# Patient Record
Sex: MALE | Race: WHITE | NOT HISPANIC OR LATINO | Employment: FULL TIME | URBAN - METROPOLITAN AREA
[De-identification: names, ages, dates, MRNs, and addresses within clinical notes are randomized per-mention and may not be internally consistent; named-entity substitution may affect disease eponyms.]

---

## 2017-01-05 DIAGNOSIS — I10 ESSENTIAL HYPERTENSION: ICD-10-CM

## 2017-01-06 RX ORDER — AMLODIPINE BESYLATE 10 MG/1
TABLET ORAL
Qty: 30 TABLET | Refills: 2 | Status: SHIPPED | OUTPATIENT
Start: 2017-01-06 | End: 2017-04-11 | Stop reason: SDUPTHER

## 2017-02-06 DIAGNOSIS — I10 ESSENTIAL HYPERTENSION: ICD-10-CM

## 2017-02-07 RX ORDER — OLMESARTAN MEDOXOMIL 40 MG/1
TABLET ORAL
Qty: 30 TABLET | Refills: 2 | Status: SHIPPED | OUTPATIENT
Start: 2017-02-07 | End: 2017-05-11 | Stop reason: SDUPTHER

## 2017-03-08 DIAGNOSIS — I10 ESSENTIAL HYPERTENSION: ICD-10-CM

## 2017-03-08 RX ORDER — OLMESARTAN MEDOXOMIL 40 MG/1
TABLET ORAL
Qty: 30 TABLET | Refills: 0 | OUTPATIENT
Start: 2017-03-08

## 2017-04-04 DIAGNOSIS — Z00.00 PREVENTATIVE HEALTH CARE: Primary | ICD-10-CM

## 2017-04-06 LAB
25(OH)D3+25(OH)D2 SERPL-MCNC: 44.2 NG/ML (ref 30–100)
ALBUMIN SERPL-MCNC: 4.5 G/DL (ref 3.5–5.2)
ALBUMIN/GLOB SERPL: 1.6 G/DL
ALP SERPL-CCNC: 68 U/L (ref 39–117)
ALT SERPL-CCNC: 22 U/L (ref 1–41)
APPEARANCE UR: CLEAR
AST SERPL-CCNC: 18 U/L (ref 1–40)
BASOPHILS # BLD AUTO: 0.02 10*3/MM3 (ref 0–0.2)
BASOPHILS NFR BLD AUTO: 0.4 % (ref 0–1.5)
BILIRUB SERPL-MCNC: 0.6 MG/DL (ref 0.1–1.2)
BILIRUB UR QL STRIP: NEGATIVE
BUN SERPL-MCNC: 19 MG/DL (ref 6–20)
BUN/CREAT SERPL: 20.7 (ref 7–25)
CALCIUM SERPL-MCNC: 9.9 MG/DL (ref 8.6–10.5)
CHLORIDE SERPL-SCNC: 101 MMOL/L (ref 98–107)
CHOLEST SERPL-MCNC: 212 MG/DL (ref 0–200)
CHOLEST/HDLC SERPL: 4.24 {RATIO}
CO2 SERPL-SCNC: 24.8 MMOL/L (ref 22–29)
COLOR UR: YELLOW
CONV COMMENT: NORMAL
CREAT SERPL-MCNC: 0.92 MG/DL (ref 0.76–1.27)
EOSINOPHIL # BLD AUTO: 0.16 10*3/MM3 (ref 0–0.7)
EOSINOPHIL NFR BLD AUTO: 3.6 % (ref 0.3–6.2)
ERYTHROCYTE [DISTWIDTH] IN BLOOD BY AUTOMATED COUNT: 12.6 % (ref 11.5–14.5)
GLOBULIN SER CALC-MCNC: 2.9 GM/DL
GLUCOSE SERPL-MCNC: 94 MG/DL (ref 65–99)
GLUCOSE UR QL: NEGATIVE
HBA1C MFR BLD: 5.2 % (ref 4.8–5.6)
HCT VFR BLD AUTO: 42.4 % (ref 40.4–52.2)
HDLC SERPL-MCNC: 50 MG/DL (ref 40–60)
HGB BLD-MCNC: 14.4 G/DL (ref 13.7–17.6)
HGB UR QL STRIP: NEGATIVE
IMM GRANULOCYTES # BLD: 0 10*3/MM3 (ref 0–0.03)
IMM GRANULOCYTES NFR BLD: 0 % (ref 0–0.5)
KETONES UR QL STRIP: NEGATIVE
LDLC SERPL CALC-MCNC: 135 MG/DL (ref 0–100)
LEUKOCYTE ESTERASE UR QL STRIP: NEGATIVE
LYMPHOCYTES # BLD AUTO: 1.18 10*3/MM3 (ref 0.9–4.8)
LYMPHOCYTES NFR BLD AUTO: 26.2 % (ref 19.6–45.3)
MCH RBC QN AUTO: 30 PG (ref 27–32.7)
MCHC RBC AUTO-ENTMCNC: 34 G/DL (ref 32.6–36.4)
MCV RBC AUTO: 88.3 FL (ref 79.8–96.2)
MONOCYTES # BLD AUTO: 0.34 10*3/MM3 (ref 0.2–1.2)
MONOCYTES NFR BLD AUTO: 7.6 % (ref 5–12)
NEUTROPHILS # BLD AUTO: 2.8 10*3/MM3 (ref 1.9–8.1)
NEUTROPHILS NFR BLD AUTO: 62.2 % (ref 42.7–76)
NITRITE UR QL STRIP: NEGATIVE
PH UR STRIP: 7.5 [PH] (ref 5–8)
PLATELET # BLD AUTO: 247 10*3/MM3 (ref 140–500)
POTASSIUM SERPL-SCNC: 4.1 MMOL/L (ref 3.5–5.2)
PROT SERPL-MCNC: 7.4 G/DL (ref 6–8.5)
PROT UR QL STRIP: NEGATIVE
PSA SERPL-MCNC: 0.72 NG/ML (ref 0–4)
RBC # BLD AUTO: 4.8 10*6/MM3 (ref 4.6–6)
SODIUM SERPL-SCNC: 139 MMOL/L (ref 136–145)
SP GR UR: 1.02 (ref 1–1.03)
T4 FREE SERPL-MCNC: 1.24 NG/DL (ref 0.93–1.7)
TESTOST SERPL-MCNC: 564 NG/DL (ref 348–1197)
TRIGL SERPL-MCNC: 135 MG/DL (ref 0–150)
TSH SERPL DL<=0.005 MIU/L-ACNC: 3.39 MIU/ML (ref 0.27–4.2)
UROBILINOGEN UR STRIP-MCNC: NORMAL MG/DL
VLDLC SERPL CALC-MCNC: 27 MG/DL (ref 5–40)
WBC # BLD AUTO: 4.5 10*3/MM3 (ref 4.5–10.7)

## 2017-04-11 DIAGNOSIS — I10 ESSENTIAL HYPERTENSION: ICD-10-CM

## 2017-04-11 RX ORDER — AMLODIPINE BESYLATE 10 MG/1
TABLET ORAL
Qty: 30 TABLET | Refills: 2 | Status: SHIPPED | OUTPATIENT
Start: 2017-04-11 | End: 2017-07-13 | Stop reason: SDUPTHER

## 2017-04-12 ENCOUNTER — OFFICE VISIT (OUTPATIENT)
Dept: INTERNAL MEDICINE | Age: 47
End: 2017-04-12

## 2017-04-12 VITALS
TEMPERATURE: 97.9 F | BODY MASS INDEX: 30.31 KG/M2 | DIASTOLIC BLOOD PRESSURE: 70 MMHG | HEIGHT: 68 IN | HEART RATE: 80 BPM | WEIGHT: 200 LBS | OXYGEN SATURATION: 99 % | SYSTOLIC BLOOD PRESSURE: 122 MMHG

## 2017-04-12 DIAGNOSIS — E78.2 MIXED HYPERLIPIDEMIA: Chronic | ICD-10-CM

## 2017-04-12 DIAGNOSIS — M22.2X1 PATELLOFEMORAL DISORDER OF RIGHT KNEE: ICD-10-CM

## 2017-04-12 DIAGNOSIS — J30.9 ATOPIC RHINITIS: Chronic | ICD-10-CM

## 2017-04-12 DIAGNOSIS — Z00.00 ENCOUNTER FOR ANNUAL HEALTH EXAMINATION: Primary | ICD-10-CM

## 2017-04-12 PROCEDURE — 99212 OFFICE O/P EST SF 10 MIN: CPT | Performed by: INTERNAL MEDICINE

## 2017-04-12 PROCEDURE — 99396 PREV VISIT EST AGE 40-64: CPT | Performed by: INTERNAL MEDICINE

## 2017-04-12 RX ORDER — FEXOFENADINE HCL 180 MG/1
180 TABLET ORAL DAILY
COMMUNITY
Start: 2017-04-12

## 2017-04-12 NOTE — PROGRESS NOTES
Jackson DOTSON Stewart / 46 y.o. / male  04/12/2017    CC: Main reason(s) for today's visit: Annual Exam      HPI:      Jackson presents for annual health maintenance visit.  H/o hyperlipidemia, lost 17 lbs but LDL has not improved significantly. Treated for essential hypertension. Not a smoker. Some soft family h/o CV dz.   C/o sinus / nasal congestion, pnd, rhinorrhea, sneezing. H/o allergic rhinitis on flonase.    · Last health maintenance visit: unsure  · General health: fair  · Lifestyle:  · Attempting to lose weight?: Yes   · Diet: adequate/fair  · Exercise: adequate/fair  · Tobacco: Never used   · Alcohol: occasional/rare  · Work: Full-time  · Reproductive health:  · Sexually active?: Yes   · Concern for STD?: No   · Sexual problems?: No problems   · Sees Urologist?: No    · Depression Screening:      PHQ-9 Depression Screening 4/12/2017   Little interest or pleasure in doing things 0   Feeling down, depressed, or hopeless 0   PHQ-9 Total Score 0         PHQ-2: 0 (Not depressed)     PHQ-9: 0 (Negative screening for depression)      ______________________________________________________________________    ALLERGIES:    Review of patient's allergies indicates no known allergies.    MEDICATIONS:       Current Outpatient Prescriptions   Medication Sig Dispense Refill   • amLODIPine (NORVASC) 10 MG tablet TAKE 1 TABLET BY MOUTH DAILY 30 tablet 2   • fluticasone (FLONASE) 50 MCG/ACT nasal spray 2 sprays into each nostril daily. Administer 2 sprays in each nostril for each dose.     • Multiple Vitamin (MULTI-VITAMIN) tablet Take 1 tablet by mouth daily.     • olmesartan (BENICAR) 40 MG tablet TAKE 1 TABLET BY MOUTH DAILY 30 tablet 2   • fexofenadine (ALLEGRA ALLERGY) 180 MG tablet Take 1 tablet by mouth Daily.       No current facility-administered medications for this visit.        PFSH:     The following portions of the patient's history were reviewed and updated as appropriate: Allergies / Current Medications / Past Medical  History / Surgical History / Social History / Family History    PROBLEM LIST:    Patient Active Problem List   Diagnosis   • Atopic rhinitis   • Anemia   • Arthralgia of multiple joints   • Carpal tunnel syndrome   • Hyperlipidemia   • Hypertension   • Elevated hemoglobin A1c   • Snoring   • Vitamin D deficiency   • Malignant melanoma   • Patellofemoral disorder of right knee       PAST MEDICAL HX:    Past Medical History:   Diagnosis Date   • Hyperlipidemia    • Hypertension    • Seasonal allergies    • Vitamin D deficiency        PAST SURGICAL HX:    Past Surgical History:   Procedure Laterality Date   • MOLE REMOVAL     • WRIST SURGERY      RIGHT-TENDON REPAIR       SOCIAL HX:    Social History     Social History   • Marital status: Single     Spouse name: N/A   • Number of children: 0   • Years of education: N/A     Occupational History   • construction      Social History Main Topics   • Smoking status: Never Smoker   • Smokeless tobacco: Never Used   • Alcohol use 0.6 oz/week     1 Shots of liquor per week      Comment: OCCASIONALLY   • Drug use: No   • Sexual activity: Not Asked     Other Topics Concern   • None     Social History Narrative       FAMILY HX:    Family History   Problem Relation Age of Onset   • Breast cancer Mother    • Hypertension Father    • Hypertension Brother    • Hypertension Brother    • Lung cancer Maternal Grandfather      smoker   • Heart failure Paternal Grandfather    • Heart attack Paternal Uncle 80       IMMUNIZATION HISTORY:    Immunization History   Administered Date(s) Administered   • Influenza (IM) Preservative Free 12/14/2016   • Influenza Split Preservative Free ID 10/02/2013   • Tdap 12/14/2016       ______________________________________________________________________    REVIEW OF SYSTEMS    Review of Systems   Constitutional:        Intended wt loss of 17 obs   HENT: Positive for congestion, postnasal drip, rhinorrhea and sneezing.    Eyes: Negative.    Respiratory:  "Negative.    Cardiovascular: Negative.    Gastrointestinal: Negative.    Endocrine: Negative.    Genitourinary: Negative.    Musculoskeletal: Arthralgias: rt knee pain.   Skin: Negative.    Allergic/Immunologic: Positive for environmental allergies.   Neurological: Negative.    Hematological: Negative.    Psychiatric/Behavioral: Negative.        VITALS:    /70  Pulse 80  Temp 97.9 °F (36.6 °C)  Ht 68\" (172.7 cm)  Wt 200 lb (90.7 kg)  SpO2 99%  BMI 30.41 kg/m2  BP Readings from Last 3 Encounters:   04/12/17 122/70   12/14/16 136/82   07/07/16 130/80     Wt Readings from Last 3 Encounters:   04/12/17 200 lb (90.7 kg)   12/14/16 217 lb (98.4 kg)   07/07/16 217 lb (98.4 kg)      Body mass index is 30.41 kg/(m^2).    PHYSICAL EXAMINATION    Physical Exam   Constitutional: He is oriented to person, place, and time. He appears well-nourished. No distress.   Noted wt loss   HENT:   Head: Normocephalic.   Right Ear: External ear normal.   Left Ear: External ear normal.   Nose: Nose normal.   Mouth/Throat: Oropharynx is clear and moist.   Eyes: Conjunctivae and EOM are normal. Pupils are equal, round, and reactive to light. No scleral icterus.   Neck: Normal range of motion. Neck supple. No tracheal deviation present. No thyromegaly present.   Cardiovascular: Normal rate, regular rhythm, normal heart sounds and intact distal pulses.  Exam reveals no gallop and no friction rub.    No murmur heard.  Pulmonary/Chest: Effort normal and breath sounds normal.   Abdominal: Soft. Bowel sounds are normal. He exhibits no distension and no mass. There is no tenderness. No hernia.   Genitourinary: Testes normal and penis normal.   Musculoskeletal: Normal range of motion. He exhibits no edema or deformity.   Mild crepitus of right knee, mild prepatellar bursal swelling   Lymphadenopathy:     He has no cervical adenopathy.     He has no axillary adenopathy.        Right: No inguinal and no supraclavicular adenopathy present.    "     Left: No inguinal and no supraclavicular adenopathy present.   Neurological: He is alert and oriented to person, place, and time. He has normal reflexes. He displays normal reflexes. No cranial nerve deficit. He exhibits normal muscle tone. Coordination normal.   Skin: Skin is intact. No rash noted. He is not diaphoretic. No cyanosis or erythema. No pallor. Nails show no clubbing.   Scar upper back   Psychiatric: He has a normal mood and affect. His behavior is normal. Judgment and thought content normal. Cognition and memory are normal.       REVIEWED DATA:    Lab Results   Component Value Date    GLU 94 04/05/2017     04/05/2017    K 4.1 04/05/2017    AST 18 04/05/2017    ALT 22 04/05/2017    BUN 19 04/05/2017    CREATININE 0.92 04/05/2017    CREATININE 0.89 07/07/2016    CREATININE 0.84 12/29/2015    EGFRIFNONA 89 04/05/2017    EGFRIFAFRI 108 04/05/2017    HGBA1C 5.20 04/05/2017    HGBA1C 5.40 07/07/2016    HGBA1C 5.8 (H) 12/29/2015     (H) 04/05/2017     (H) 07/07/2016     (H) 12/29/2015    HDL 50 04/05/2017    HDL 49 07/07/2016    HDL 47 12/29/2015    TRIG 135 04/05/2017    TRIG 231 (H) 07/07/2016    TRIG 269 (H) 12/29/2015    CHOLHDLRATIO 4.24 04/05/2017    TSH 3.390 04/05/2017    FREET4 1.24 04/05/2017    SMCR20DZ 44.2 04/05/2017    PSA 0.719 04/05/2017    TESTOSTEROTT 564 04/05/2017    WBC 4.50 04/05/2017    HGB 14.4 04/05/2017    MCV 88.3 04/05/2017     04/05/2017    PROTEIN Negative 04/05/2017    GLUCOSEU Negative 04/05/2017    BLOODU Negative 04/05/2017    NITRITEU Negative 04/05/2017    LEUKOCYTESUR Negative 04/05/2017     ______________________________________________________________________    ASSESSMENT & PLAN    1. Encounter for annual health examination      2. Mixed hyperlipidemia  Will recheck lipid panel in 6 months. If not improving will consider statin therapy   Maintain low fat/cholesterol diet.  Continue good lifestyle improvements.    3. Atopic  rhinitis  - fexofenadine (ALLEGRA ALLERGY) 180 MG tablet; Take 1 tablet by mouth Daily.  - continue flonase nasal daily    4. Patellofemoral syndrome (right)  - handout for exercise given; see me if not improving        Summary/Discussion:     ·       Return in about 6 months (around 10/12/2017) for Hypertension, Hyperlipidemia.    Future Appointments  Date Time Provider Department Center   10/12/2017 9:00 AM MD JACOB [facility] None         HEALTHCARE MAINTENANCE ISSUES:    Cancer Screening:  · Colon: Initial/Next screening at age: 51  · Repeat colonoscopy every N/A at this time  · Prostate: Start screening at 50 then annually  · Testicular: Recommended monthly self exam  · Skin: Monthly self skin examination, annual exam by health professional  · Lung:   · Other:    Screening Labs & Tests:  · Lab results reviewed & discussed with with patient or orders placed today.  · EKG:  · DEXA (75+ or risk factors):  · HEP C (If born [id number] or risk factors): Not indicated    Immunization/Vaccinations (to be given today unless deferred by patient)  · Tetanus/Pertussis: Up to date  · Pneumovax: Not needed at this time  · Prevnar 13: Not needed at this time  · Zostavax: Not needed at this time  · Influenza: Patient already had the flu shot  · Other:     Lifestyle Counseling:  · Lifestyle Modifications: Continue good lifestyle choices/modifications, Maintain a low sugar/carbohydrate diet and Follow a low fat, low cholesterol diet  · Safety Issues: Always wear seatbelt, Avoid texting while driving   · Use sunscreen, regular skin examination  · Recommended annual dental/vision examination.  · Emotional/Stress/Sleep: Reviewed and  given when appropriate      HEALTH MAINTENANCE LIST:    Health Maintenance   Topic Date Due   • LIPID PANEL  04/05/2018   • TDAP/TD VACCINES (2 - Td) 12/14/2026   • INFLUENZA VACCINE  Completed         **Key Disclaimer:   Much of this encounter note is an electronic  transcription/translation of spoken language to printed text. The electronic translation of spoken language may permit erroneous, or at times, nonsensical words or phrases to be inadvertently transcribed. Although I have reviewed the note for such errors, some may still exist.

## 2017-05-11 DIAGNOSIS — I10 ESSENTIAL HYPERTENSION: ICD-10-CM

## 2017-05-12 RX ORDER — OLMESARTAN MEDOXOMIL 40 MG/1
TABLET ORAL
Qty: 30 TABLET | Refills: 5 | Status: SHIPPED | OUTPATIENT
Start: 2017-05-12 | End: 2017-11-13 | Stop reason: SDUPTHER

## 2017-07-13 DIAGNOSIS — I10 ESSENTIAL HYPERTENSION: ICD-10-CM

## 2017-07-14 RX ORDER — AMLODIPINE BESYLATE 10 MG/1
TABLET ORAL
Qty: 30 TABLET | Refills: 2 | Status: SHIPPED | OUTPATIENT
Start: 2017-07-14 | End: 2017-10-14 | Stop reason: SDUPTHER

## 2017-10-12 ENCOUNTER — OFFICE VISIT (OUTPATIENT)
Dept: INTERNAL MEDICINE | Age: 47
End: 2017-10-12

## 2017-10-12 VITALS
HEIGHT: 68 IN | HEART RATE: 62 BPM | TEMPERATURE: 98.1 F | SYSTOLIC BLOOD PRESSURE: 124 MMHG | WEIGHT: 208 LBS | OXYGEN SATURATION: 98 % | BODY MASS INDEX: 31.52 KG/M2 | DIASTOLIC BLOOD PRESSURE: 70 MMHG

## 2017-10-12 DIAGNOSIS — Z00.00 PREVENTATIVE HEALTH CARE: Primary | ICD-10-CM

## 2017-10-12 DIAGNOSIS — I10 ESSENTIAL HYPERTENSION: Primary | Chronic | ICD-10-CM

## 2017-10-12 DIAGNOSIS — F32.9 REACTIVE DEPRESSION (SITUATIONAL): ICD-10-CM

## 2017-10-12 DIAGNOSIS — E66.9 OBESITY (BMI 30-39.9): Chronic | ICD-10-CM

## 2017-10-12 DIAGNOSIS — E78.2 MIXED HYPERLIPIDEMIA: Chronic | ICD-10-CM

## 2017-10-12 LAB
CHOLEST SERPL-MCNC: 235 MG/DL (ref 0–200)
CHOLEST/HDLC SERPL: 4.12 {RATIO}
HDLC SERPL-MCNC: 57 MG/DL (ref 40–60)
LDLC SERPL CALC-MCNC: 145 MG/DL (ref 0–100)
TRIGL SERPL-MCNC: 165 MG/DL (ref 0–150)
VLDLC SERPL CALC-MCNC: 33 MG/DL (ref 5–40)

## 2017-10-12 PROCEDURE — 99214 OFFICE O/P EST MOD 30 MIN: CPT | Performed by: INTERNAL MEDICINE

## 2017-10-12 PROCEDURE — 90686 IIV4 VACC NO PRSV 0.5 ML IM: CPT | Performed by: INTERNAL MEDICINE

## 2017-10-12 PROCEDURE — 90471 IMMUNIZATION ADMIN: CPT | Performed by: INTERNAL MEDICINE

## 2017-10-12 NOTE — ASSESSMENT & PLAN NOTE
Relationship related. Recommended therapy. Counseled x 15 minutes. Consider medication if worsening symptoms.

## 2017-10-12 NOTE — PROGRESS NOTES
"Jackson DOTSON Stewart / 46 y.o. / male  10/12/2017    VITALS    /70  Pulse 62  Temp 98.1 °F (36.7 °C)  Ht 68\" (172.7 cm)  Wt 208 lb (94.3 kg)  SpO2 98%  BMI 31.63 kg/m2  BP Readings from Last 3 Encounters:   10/12/17 124/70   04/12/17 122/70   12/14/16 136/82     Wt Readings from Last 3 Encounters:   10/12/17 208 lb (94.3 kg)   04/12/17 200 lb (90.7 kg)   12/14/16 217 lb (98.4 kg)      Body mass index is 31.63 kg/(m^2).    CC:  Main reason(s) for today's visit: Follow-up for hypertension, cholesterol and other related medical problems    HPI:   Broke off engagement after 4 years relationship, going through transition difficulties x 2 months. No SI. Has not seen a therapist.  Weight gain of 8 lbs due to not taking good care of self. Works 6 days a week.    Obesity: Weight has been increasing since last visit, has not made any significant dietary improvement, has not made significant changes to physical activity level, is not motivated to work on weight loss.  Weight loss efforts: no specific plan(s).  Current Body mass index is 31.63 kg/(m^2)..   Wt Readings from Last 3 Encounters:   10/12/17 208 lb (94.3 kg)   04/12/17 200 lb (90.7 kg)   12/14/16 217 lb (98.4 kg)         Chronic essential hypertension:  Since prior visit: compliant with medication(s) and denies significant problems with medication(s).  Most recent in-office blood pressure readings:   BP Readings from Last 3 Encounters:   10/12/17 124/70   04/12/17 122/70   12/14/16 136/82       Chronic hyperlipidemia:  Current therapy include low fat/cholesterol diet, really has not been watching his diet. .    Most recent labs:   Lab Results   Component Value Date     (H) 04/05/2017     (H) 07/07/2016     (H) 12/29/2015    HDL 50 04/05/2017    HDL 49 07/07/2016    TRIG 135 04/05/2017    CHOLHDLRATIO 4.24 04/05/2017       Patient Care Team:  Kelby Seo MD as PCP - General (Internal " Medicine)  ____________________________________________________________________    ASSESSMENT & PLAN:    Problem List Items Addressed This Visit     Hypertension - Primary (Chronic)    Overview     Stable. Continue amlodipine 10 mg and olmesartan 40 mg.          Relevant Medications    olmesartan (BENICAR) 40 MG tablet    amLODIPine (NORVASC) 10 MG tablet    Hyperlipidemia (Chronic)    Current Assessment & Plan     Maintain low fat/cholesterol diet.  Check fasting labs today and consider medication if not better.          Relevant Orders    Lipid Panel With / Chol / HDL Ratio    Reactive depression (situational)    Current Assessment & Plan     Relationship related. Recommended therapy. Counseled x 15 minutes. Consider medication if worsening symptoms.          Obesity (BMI 30-39.9) (Chronic)        Orders Placed This Encounter   Procedures   • Lipid Panel With / Chol / HDL Ratio       Summary/Discussion:     ·     Return in about 6 months (around 4/12/2018) for Hypertension, hyperlipidemia.    Future Appointments  Date Time Provider Department Center   4/12/2018 8:40 AM MD [facility] None       ____________________________________________________________________      REVIEW OF SYSTEMS    Review of Systems  As noted per HPI  Constitutional neg  Resp neg  CV neg   Other: As noted per HPI      PHYSICAL EXAMINATION    Physical Exam  Constitutional  No distress  Cardiovascular Rate  normal . Rhythm: regular . Heart sounds:  normal  Pulmonary/Chest  Effort normal. Breath sounds:  normal  Psychiatric  Alert. Judgment and thought content normal. Mood normal    REVIEWED DATA:    Labs:   Lab Results   Component Value Date     04/05/2017    K 4.1 04/05/2017    AST 18 04/05/2017    ALT 22 04/05/2017    BUN 19 04/05/2017    CREATININE 0.92 04/05/2017    CREATININE 0.89 07/07/2016    CREATININE 0.84 12/29/2015    EGFRIFNONA 89 04/05/2017    EGFRIFAFRI 108 04/05/2017       Lab Results   Component Value Date     HGBA1C 5.20 04/05/2017    HGBA1C 5.40 07/07/2016    HGBA1C 5.8 (H) 12/29/2015    GLU 94 04/05/2017    GLU 92 07/07/2016    GLU 95 12/29/2015       Lab Results   Component Value Date     (H) 04/05/2017     (H) 07/07/2016     (H) 12/29/2015    HDL 50 04/05/2017    HDL 49 07/07/2016    TRIG 135 04/05/2017    CHOLHDLRATIO 4.24 04/05/2017       Lab Results   Component Value Date    TSH 3.390 04/05/2017    FREET4 1.24 04/05/2017          Lab Results   Component Value Date    WBC 4.50 04/05/2017    HGB 14.4 04/05/2017    HGB 15.3 07/07/2016    HGB 13.9 06/30/2015     04/05/2017        Imaging:        Medical Tests:        Summary of old records / correspondence / consultant report:        Request outside records:        ALLERGIES  No Known Allergies     MEDICATIONS  Current Outpatient Prescriptions   Medication Sig Dispense Refill   • amLODIPine (NORVASC) 10 MG tablet TAKE 1 TABLET BY MOUTH DAILY 30 tablet 2   • fexofenadine (ALLEGRA ALLERGY) 180 MG tablet Take 1 tablet by mouth Daily.     • fluticasone (FLONASE) 50 MCG/ACT nasal spray 2 sprays into each nostril daily. Administer 2 sprays in each nostril for each dose.     • Multiple Vitamin (MULTI-VITAMIN) tablet Take 1 tablet by mouth daily.     • olmesartan (BENICAR) 40 MG tablet TAKE 1 TABLET BY MOUTH DAILY 30 tablet 5     No current facility-administered medications for this visit.        PFSH:     The following portions of the patient's history were reviewed and updated as appropriate: Allergies / Current Medications / Past Medical History / Surgical History / Social History / Family History    PROBLEM LIST   Patient Active Problem List   Diagnosis   • Atopic rhinitis   • Anemia   • Arthralgia of multiple joints   • Carpal tunnel syndrome   • Hyperlipidemia   • Hypertension   • Elevated hemoglobin A1c   • Snoring   • Vitamin D deficiency   • Malignant melanoma   • Patellofemoral disorder of right knee   • Obesity (BMI 30-39.9)   • Reactive  depression (situational)       PAST MEDICAL HISTORY  Past Medical History:   Diagnosis Date   • Hyperlipidemia    • Hypertension    • Seasonal allergies    • Vitamin D deficiency        SURGICAL HISTORY  Past Surgical History:   Procedure Laterality Date   • MOLE REMOVAL     • WRIST SURGERY      RIGHT-TENDON REPAIR       SOCIAL HISTORY  Social History     Social History   • Marital status: Single     Spouse name: N/A   • Number of children: 0   • Years of education: N/A     Occupational History   • construction      Social History Main Topics   • Smoking status: Never Smoker   • Smokeless tobacco: Never Used   • Alcohol use 0.6 oz/week     1 Shots of liquor per week      Comment: OCCASIONALLY   • Drug use: No   • Sexual activity: Not Asked     Other Topics Concern   • None     Social History Narrative       FAMILY HISTORY  Family History   Problem Relation Age of Onset   • Breast cancer Mother    • Hypertension Father    • Hypertension Brother    • Hypertension Brother    • Lung cancer Maternal Grandfather      smoker   • Heart failure Paternal Grandfather    • Heart attack Paternal Uncle 80         **Key Disclaimer:   Much of this encounter note is an electronic transcription/translation of spoken language to printed text. The electronic translation of spoken language may permit erroneous, or at times, nonsensical words or phrases to be inadvertently transcribed. Although I have reviewed the note for such errors, some may still exist.

## 2017-10-12 NOTE — ASSESSMENT & PLAN NOTE
Maintain low fat/cholesterol diet.  Check fasting labs today and consider medication if not better.

## 2017-10-13 ENCOUNTER — TELEPHONE (OUTPATIENT)
Dept: INTERNAL MEDICINE | Age: 47
End: 2017-10-13

## 2017-10-13 NOTE — TELEPHONE ENCOUNTER
----- Message from Kelby Seo MD sent at 10/13/2017  8:15 AM EDT -----  Send result letter to patient.     Jackson, these are your recent lab results:    Cholesterol is moderately high. Maintain low fat/cholesterol diet. . Will discuss treatment options on follow up.     Please call my office if you have any questions. Otherwise, we can discuss the results in further detail on your next visit.

## 2017-10-14 DIAGNOSIS — I10 ESSENTIAL HYPERTENSION: ICD-10-CM

## 2017-10-16 RX ORDER — AMLODIPINE BESYLATE 10 MG/1
TABLET ORAL
Qty: 30 TABLET | Refills: 5 | Status: SHIPPED | OUTPATIENT
Start: 2017-10-16 | End: 2018-04-25 | Stop reason: SDUPTHER

## 2017-11-13 DIAGNOSIS — I10 ESSENTIAL HYPERTENSION: ICD-10-CM

## 2017-11-14 RX ORDER — OLMESARTAN MEDOXOMIL 40 MG/1
TABLET ORAL
Qty: 30 TABLET | Refills: 5 | Status: SHIPPED | OUTPATIENT
Start: 2017-11-14 | End: 2018-05-29 | Stop reason: SDUPTHER

## 2018-04-12 ENCOUNTER — OFFICE VISIT (OUTPATIENT)
Dept: INTERNAL MEDICINE | Age: 48
End: 2018-04-12

## 2018-04-12 VITALS
BODY MASS INDEX: 30.92 KG/M2 | SYSTOLIC BLOOD PRESSURE: 126 MMHG | DIASTOLIC BLOOD PRESSURE: 80 MMHG | HEIGHT: 68 IN | HEART RATE: 68 BPM | WEIGHT: 204 LBS | TEMPERATURE: 98 F | OXYGEN SATURATION: 98 %

## 2018-04-12 DIAGNOSIS — I10 ESSENTIAL HYPERTENSION: Primary | Chronic | ICD-10-CM

## 2018-04-12 DIAGNOSIS — E66.9 OBESITY (BMI 30-39.9): Chronic | ICD-10-CM

## 2018-04-12 DIAGNOSIS — E78.2 MIXED HYPERLIPIDEMIA: Chronic | ICD-10-CM

## 2018-04-12 DIAGNOSIS — F32.9 REACTIVE DEPRESSION (SITUATIONAL): ICD-10-CM

## 2018-04-12 LAB
ALBUMIN SERPL-MCNC: 4.5 G/DL (ref 3.5–5.2)
ALBUMIN/GLOB SERPL: 1.7 G/DL
ALP SERPL-CCNC: 55 U/L (ref 39–117)
ALT SERPL-CCNC: 21 U/L (ref 1–41)
AST SERPL-CCNC: 19 U/L (ref 1–40)
BILIRUB SERPL-MCNC: 0.5 MG/DL (ref 0.1–1.2)
BUN SERPL-MCNC: 15 MG/DL (ref 6–20)
BUN/CREAT SERPL: 16.9 (ref 7–25)
CALCIUM SERPL-MCNC: 9.2 MG/DL (ref 8.6–10.5)
CHLORIDE SERPL-SCNC: 102 MMOL/L (ref 98–107)
CHOLEST SERPL-MCNC: 219 MG/DL (ref 0–200)
CHOLEST/HDLC SERPL: 4.06 {RATIO}
CO2 SERPL-SCNC: 26.4 MMOL/L (ref 22–29)
CREAT SERPL-MCNC: 0.89 MG/DL (ref 0.76–1.27)
GFR SERPLBLD CREATININE-BSD FMLA CKD-EPI: 112 ML/MIN/1.73
GFR SERPLBLD CREATININE-BSD FMLA CKD-EPI: 92 ML/MIN/1.73
GLOBULIN SER CALC-MCNC: 2.7 GM/DL
GLUCOSE SERPL-MCNC: 88 MG/DL (ref 65–99)
HDLC SERPL-MCNC: 54 MG/DL (ref 40–60)
LDLC SERPL CALC-MCNC: 150 MG/DL (ref 0–100)
POTASSIUM SERPL-SCNC: 4.5 MMOL/L (ref 3.5–5.2)
PROT SERPL-MCNC: 7.2 G/DL (ref 6–8.5)
SODIUM SERPL-SCNC: 141 MMOL/L (ref 136–145)
T4 FREE SERPL-MCNC: 1.27 NG/DL (ref 0.93–1.7)
TRIGL SERPL-MCNC: 73 MG/DL (ref 0–150)
TSH SERPL DL<=0.005 MIU/L-ACNC: 3.16 MIU/ML (ref 0.27–4.2)
VLDLC SERPL CALC-MCNC: 14.6 MG/DL (ref 5–40)

## 2018-04-12 PROCEDURE — 99214 OFFICE O/P EST MOD 30 MIN: CPT | Performed by: INTERNAL MEDICINE

## 2018-04-12 NOTE — PROGRESS NOTES
[facility] [place] INTERNAL MEDICINE  LATONIA NAVARRO M.D.      Jackson DOTSON Stewart / 47 y.o. / male  04/12/2018      ASSESSMENT & PLAN:    Problem List Items Addressed This Visit        High    Hypertension - Primary (Chronic)    Overview     Stable. Continue amlodipine 10 mg and olmesartan 40 mg.          Current Assessment & Plan     Monitor home BP readings.          Relevant Medications    amLODIPine (NORVASC) 10 MG tablet    olmesartan (BENICAR) 40 MG tablet    Other Relevant Orders    Comprehensive Metabolic Panel       Medium    Hyperlipidemia (Chronic)    Current Assessment & Plan     Maintain low fat/cholesterol diet.  Check labs today.          Relevant Orders    Lipid Panel With / Chol / HDL Ratio       Low    Obesity (BMI 30-39.9) (Chronic)    Overview     Maintain a low sugar/starch/carbohydrate diet and exercise regularly. Wt loss advised.           Relevant Orders    TSH+Free T4    Reactive depression (situational)    Current Assessment & Plan     Doing better at this time.            Other Visit Diagnoses    None.       Orders Placed This Encounter   Procedures   • Lipid Panel With / Chol / HDL Ratio   • Comprehensive Metabolic Panel   • TSH+Free T4       Summary/Discussion:      Return in about 6 months (around 10/12/2018) for Annual physical.    ____________________________________________________________________    MEDICATIONS  Current Outpatient Prescriptions   Medication Sig Dispense Refill   • amLODIPine (NORVASC) 10 MG tablet TAKE 1 TABLET BY MOUTH DAILY 30 tablet 5   • Multiple Vitamin (MULTI-VITAMIN) tablet Take 1 tablet by mouth daily.     • olmesartan (BENICAR) 40 MG tablet TAKE 1 TABLET BY MOUTH DAILY 30 tablet 5   • fexofenadine (ALLEGRA ALLERGY) 180 MG tablet Take 1 tablet by mouth Daily.     • fluticasone (FLONASE) 50 MCG/ACT nasal spray 2 sprays into each nostril daily. Administer 2 sprays in each nostril for each dose.       No current facility-administered medications for this visit.   "        VITALS:    Visit Vitals  /80   Pulse 68   Temp 98 °F (36.7 °C)   Ht 172.7 cm (67.99\")   Wt 92.5 kg (204 lb)   SpO2 98%   BMI 31.03 kg/m²       BP Readings from Last 3 Encounters:   04/12/18 126/80   10/12/17 124/70   04/12/17 122/70     Wt Readings from Last 3 Encounters:   04/12/18 92.5 kg (204 lb)   10/12/17 94.3 kg (208 lb)   04/12/17 90.7 kg (200 lb)      Body mass index is 31.03 kg/m².    CC:  Main reason(s) for today's visit: Hyperlipidemia and Hypertension      HPI:     Chronic essential hypertension:  Since prior visit: compliant with medication(s), does not check blood pressure at home and denies significant problems with medication(s).  Most recent in-office blood pressure readings:   BP Readings from Last 3 Encounters:   04/12/18 126/80   10/12/17 124/70   04/12/17 122/70     Chronic hyperlipidemia:  Current therapy include low fat/cholesterol diet, no prior medication.    Most recent labs:   Lab Results   Component Value Date     (H) 10/12/2017     (H) 04/05/2017     (H) 07/07/2016    HDL 57 10/12/2017    HDL 50 04/05/2017    HDL 49 07/07/2016    TRIG 165 (H) 10/12/2017    CHOLHDLRATIO 4.12 10/12/2017    CHOLHDLRATIO 4.24 04/05/2017     Obesity: Weight has been decreasing since last visit, has made slight dietary improvement, has not made significant changes to physical activity level.  Weight loss efforts: no specific plan(s).  Current Body mass index is 31.03 kg/m².   Wt Readings from Last 3 Encounters:   04/12/18 92.5 kg (204 lb)   10/12/17 94.3 kg (208 lb)   04/12/17 90.7 kg (200 lb)       Reactive depression is improving. No SI.     Patient Care Team:  Kelby Seo MD as PCP - General (Internal Medicine)    ____________________________________________________________________    REVIEW OF SYSTEMS    Review of Systems  Constitutional neg  Resp neg  CV neg  Neuro neg  psyc no suicidal ideation       PHYSICAL EXAMINATION    Physical Exam  Constitutional  No distress, " mildly obese  Cardiovascular Rate  normal . Rhythm: regular . Heart sounds:  Normal  Pulmonary/Chest  Effort normal. Breath sounds:  Normal  Psychiatric  Alert. Judgment and thought content normal. Mood normal    REVIEWED DATA:    Labs:     Lab Results   Component Value Date     04/05/2017    K 4.1 04/05/2017    AST 18 04/05/2017    ALT 22 04/05/2017    BUN 19 04/05/2017    CREATININE 0.92 04/05/2017    CREATININE 0.89 07/07/2016    CREATININE 0.84 12/29/2015    EGFRIFNONA 89 04/05/2017    EGFRIFAFRI 108 04/05/2017       Lab Results   Component Value Date    HGBA1C 5.20 04/05/2017    HGBA1C 5.40 07/07/2016    HGBA1C 5.8 (H) 12/29/2015       Lab Results   Component Value Date     (H) 10/12/2017     (H) 04/05/2017     (H) 07/07/2016    HDL 57 10/12/2017    HDL 50 04/05/2017    HDL 49 07/07/2016    TRIG 165 (H) 10/12/2017    TRIG 135 04/05/2017    TRIG 231 (H) 07/07/2016    CHOLHDLRATIO 4.12 10/12/2017    CHOLHDLRATIO 4.24 04/05/2017       Lab Results   Component Value Date    TSH 3.390 04/05/2017    FREET4 1.24 04/05/2017       Lab Results   Component Value Date    WBC 4.50 04/05/2017    HGB 14.4 04/05/2017    HGB 15.3 07/07/2016    HGB 13.9 06/30/2015     04/05/2017       Imaging:         Medical Tests:         Summary of old records / correspondence / consultant report:         Request outside records:         ALLERGIES  No Known Allergies     PFSH:     The following portions of the patient's history were reviewed and updated as appropriate: Allergies / Current Medications / Past Medical History / Surgical History / Social History / Family History    PROBLEM LIST   Patient Active Problem List   Diagnosis   • Atopic rhinitis   • Anemia   • Arthralgia of multiple joints   • Carpal tunnel syndrome   • Hyperlipidemia   • Hypertension   • Elevated hemoglobin A1c   • Snoring   • Vitamin D deficiency   • Malignant melanoma   • Patellofemoral disorder of right knee   • Obesity (BMI 30-39.9)    • Reactive depression (situational)       PAST MEDICAL HISTORY  Past Medical History:   Diagnosis Date   • Hyperlipidemia    • Hypertension    • Seasonal allergies    • Vitamin D deficiency        SURGICAL HISTORY  Past Surgical History:   Procedure Laterality Date   • MOLE REMOVAL     • WRIST SURGERY      RIGHT-TENDON REPAIR       SOCIAL HISTORY  Social History     Social History   • Marital status: Single   • Number of children: 0     Occupational History   • construction      Social History Main Topics   • Smoking status: Never Smoker   • Smokeless tobacco: Never Used   • Alcohol use 0.6 oz/week     1 Shots of liquor per week      Comment: OCCASIONALLY   • Drug use: No     Other Topics Concern   • Not on file       FAMILY HISTORY  Family History   Problem Relation Age of Onset   • Breast cancer Mother    • Hypertension Father    • Hypertension Brother    • Hypertension Brother    • Lung cancer Maternal Grandfather      smoker   • Heart failure Paternal Grandfather    • Heart attack Paternal Uncle 80         **Key Disclaimer:   Much of this encounter note is an electronic transcription/translation of spoken language to printed text. The electronic translation of spoken language may permit erroneous, or at times, nonsensical words or phrases to be inadvertently transcribed. Although I have reviewed the note for such errors, some may still exist.

## 2018-04-13 ENCOUNTER — TELEPHONE (OUTPATIENT)
Dept: INTERNAL MEDICINE | Age: 48
End: 2018-04-13

## 2018-04-13 DIAGNOSIS — E78.2 MIXED HYPERLIPIDEMIA: Primary | Chronic | ICD-10-CM

## 2018-04-13 RX ORDER — ROSUVASTATIN CALCIUM 10 MG/1
10 TABLET, COATED ORAL NIGHTLY
Qty: 30 TABLET | Refills: 3 | Status: SHIPPED | OUTPATIENT
Start: 2018-04-13 | End: 2018-09-02 | Stop reason: SDUPTHER

## 2018-04-13 NOTE — TELEPHONE ENCOUNTER
Pt informed of results will send to pt home mailing address for review new meds sent to pharmacy for . SELVIN

## 2018-04-13 NOTE — TELEPHONE ENCOUNTER
----- Message from Kelby Seo MD sent at 4/13/2018  7:52 AM EDT -----  Call patient with his test result(s) and mail the results to him if MyChart is NOT active.    I would recommend starting medication for high cholesterol.   Start rosuvastatin 10 mg qHS. Schedule fasting labs lipid panel and LFTs for 3 months.     (REMINDER: Update med list, maintain dx association, and update change on CURRENT ASSESSMENT/PLAN)

## 2018-04-13 NOTE — ASSESSMENT & PLAN NOTE
Pt to add rosuvastatin 10 mg at night and recheck fasting labs and LFT's in 3 mo nth Per Dr Gabbi MONTALVO

## 2018-04-25 DIAGNOSIS — I10 ESSENTIAL HYPERTENSION: ICD-10-CM

## 2018-04-26 RX ORDER — AMLODIPINE BESYLATE 10 MG/1
TABLET ORAL
Qty: 30 TABLET | Refills: 2 | Status: SHIPPED | OUTPATIENT
Start: 2018-04-26 | End: 2018-07-31 | Stop reason: SDUPTHER

## 2018-05-29 DIAGNOSIS — I10 ESSENTIAL HYPERTENSION: ICD-10-CM

## 2018-05-30 RX ORDER — OLMESARTAN MEDOXOMIL 40 MG/1
TABLET ORAL
Qty: 30 TABLET | Refills: 2 | Status: SHIPPED | OUTPATIENT
Start: 2018-05-30 | End: 2018-09-02 | Stop reason: SDUPTHER

## 2018-07-09 ENCOUNTER — RESULTS ENCOUNTER (OUTPATIENT)
Dept: INTERNAL MEDICINE | Age: 48
End: 2018-07-09

## 2018-07-09 DIAGNOSIS — E78.2 MIXED HYPERLIPIDEMIA: Chronic | ICD-10-CM

## 2018-07-13 LAB
ALBUMIN SERPL-MCNC: 4.6 G/DL (ref 3.5–5.2)
ALBUMIN/GLOB SERPL: 1.9 G/DL
ALP SERPL-CCNC: 56 U/L (ref 39–117)
ALT SERPL-CCNC: 33 U/L (ref 1–41)
AST SERPL-CCNC: 22 U/L (ref 1–40)
BILIRUB DIRECT SERPL-MCNC: <0.2 MG/DL (ref 0–0.3)
BILIRUB SERPL-MCNC: 0.2 MG/DL (ref 0.1–1.2)
BUN SERPL-MCNC: 22 MG/DL (ref 6–20)
BUN/CREAT SERPL: 25.3 (ref 7–25)
CALCIUM SERPL-MCNC: 9.4 MG/DL (ref 8.6–10.5)
CHLORIDE SERPL-SCNC: 103 MMOL/L (ref 98–107)
CHOLEST SERPL-MCNC: 149 MG/DL (ref 0–200)
CHOLEST/HDLC SERPL: 3.31 {RATIO}
CO2 SERPL-SCNC: 24.1 MMOL/L (ref 22–29)
CREAT SERPL-MCNC: 0.87 MG/DL (ref 0.76–1.27)
GLOBULIN SER CALC-MCNC: 2.4 GM/DL
GLUCOSE SERPL-MCNC: 95 MG/DL (ref 65–99)
HDLC SERPL-MCNC: 45 MG/DL (ref 40–60)
LDLC SERPL CALC-MCNC: 83 MG/DL (ref 0–100)
POTASSIUM SERPL-SCNC: 4.5 MMOL/L (ref 3.5–5.2)
PROT SERPL-MCNC: 7 G/DL (ref 6–8.5)
SODIUM SERPL-SCNC: 141 MMOL/L (ref 136–145)
TRIGL SERPL-MCNC: 105 MG/DL (ref 0–150)
VLDLC SERPL CALC-MCNC: 21 MG/DL (ref 5–40)

## 2018-07-16 ENCOUNTER — TELEPHONE (OUTPATIENT)
Dept: INTERNAL MEDICINE | Age: 48
End: 2018-07-16

## 2018-07-16 NOTE — TELEPHONE ENCOUNTER
----- Message from Kelby Seo MD sent at 7/16/2018  9:01 AM EDT -----  Send result letter to patient.     Jackson, your cholesterol levels are stable/within acceptable limits. Continue current plans as we discussed.  Keep your follow-up appointment with me on 10/12/18.     Please call my office if you have any questions. Otherwise, we can discuss the results in further detail on your next visit.

## 2018-07-31 DIAGNOSIS — I10 ESSENTIAL HYPERTENSION: ICD-10-CM

## 2018-08-01 RX ORDER — AMLODIPINE BESYLATE 10 MG/1
TABLET ORAL
Qty: 30 TABLET | Refills: 2 | Status: SHIPPED | OUTPATIENT
Start: 2018-08-01 | End: 2018-11-02 | Stop reason: SDUPTHER

## 2018-09-02 DIAGNOSIS — E78.2 MIXED HYPERLIPIDEMIA: Chronic | ICD-10-CM

## 2018-09-02 DIAGNOSIS — I10 ESSENTIAL HYPERTENSION: ICD-10-CM

## 2018-09-04 RX ORDER — OLMESARTAN MEDOXOMIL 40 MG/1
TABLET ORAL
Qty: 30 TABLET | Refills: 5 | Status: SHIPPED | OUTPATIENT
Start: 2018-09-04 | End: 2019-03-10 | Stop reason: SDUPTHER

## 2018-09-04 RX ORDER — ROSUVASTATIN CALCIUM 10 MG/1
10 TABLET, COATED ORAL NIGHTLY
Qty: 30 TABLET | Refills: 5 | Status: SHIPPED | OUTPATIENT
Start: 2018-09-04 | End: 2019-03-10 | Stop reason: SDUPTHER

## 2018-10-04 DIAGNOSIS — Z00.00 PREVENTATIVE HEALTH CARE: Primary | ICD-10-CM

## 2018-10-08 LAB
ALBUMIN SERPL-MCNC: 5.1 G/DL (ref 3.5–5.2)
ALBUMIN/GLOB SERPL: 2 G/DL
ALP SERPL-CCNC: 55 U/L (ref 39–117)
ALT SERPL-CCNC: 33 U/L (ref 1–41)
APPEARANCE UR: CLEAR
AST SERPL-CCNC: 21 U/L (ref 1–40)
BASOPHILS # BLD AUTO: 0.04 10*3/MM3 (ref 0–0.2)
BASOPHILS NFR BLD AUTO: 0.7 % (ref 0–1.5)
BILIRUB SERPL-MCNC: 0.2 MG/DL (ref 0.1–1.2)
BILIRUB UR QL STRIP: NEGATIVE
BUN SERPL-MCNC: 15 MG/DL (ref 6–20)
BUN/CREAT SERPL: 16.9 (ref 7–25)
CALCIUM SERPL-MCNC: 9.5 MG/DL (ref 8.6–10.5)
CHLORIDE SERPL-SCNC: 103 MMOL/L (ref 98–107)
CHOLEST SERPL-MCNC: 147 MG/DL (ref 0–200)
CHOLEST/HDLC SERPL: 2.83 {RATIO}
CO2 SERPL-SCNC: 28.1 MMOL/L (ref 22–29)
COLOR UR: YELLOW
CREAT SERPL-MCNC: 0.89 MG/DL (ref 0.76–1.27)
EOSINOPHIL # BLD AUTO: 0.15 10*3/MM3 (ref 0–0.7)
EOSINOPHIL NFR BLD AUTO: 2.6 % (ref 0.3–6.2)
ERYTHROCYTE [DISTWIDTH] IN BLOOD BY AUTOMATED COUNT: 12.3 % (ref 11.5–14.5)
GLOBULIN SER CALC-MCNC: 2.5 GM/DL
GLUCOSE SERPL-MCNC: 104 MG/DL (ref 65–99)
GLUCOSE UR QL: NEGATIVE
HBA1C MFR BLD: 5.4 % (ref 4.8–5.6)
HCT VFR BLD AUTO: 43.1 % (ref 40.4–52.2)
HDLC SERPL-MCNC: 52 MG/DL (ref 40–60)
HGB BLD-MCNC: 13.9 G/DL (ref 13.7–17.6)
HGB UR QL STRIP: NEGATIVE
IMM GRANULOCYTES # BLD: 0 10*3/MM3 (ref 0–0.03)
IMM GRANULOCYTES NFR BLD: 0 % (ref 0–0.5)
KETONES UR QL STRIP: NEGATIVE
LDLC SERPL CALC-MCNC: 76 MG/DL (ref 0–100)
LEUKOCYTE ESTERASE UR QL STRIP: NEGATIVE
LYMPHOCYTES # BLD AUTO: 1.11 10*3/MM3 (ref 0.9–4.8)
LYMPHOCYTES NFR BLD AUTO: 19 % (ref 19.6–45.3)
MCH RBC QN AUTO: 29 PG (ref 27–32.7)
MCHC RBC AUTO-ENTMCNC: 32.3 G/DL (ref 32.6–36.4)
MCV RBC AUTO: 89.8 FL (ref 79.8–96.2)
MONOCYTES # BLD AUTO: 0.5 10*3/MM3 (ref 0.2–1.2)
MONOCYTES NFR BLD AUTO: 8.6 % (ref 5–12)
NEUTROPHILS # BLD AUTO: 4.03 10*3/MM3 (ref 1.9–8.1)
NEUTROPHILS NFR BLD AUTO: 69.1 % (ref 42.7–76)
NITRITE UR QL STRIP: NEGATIVE
PH UR STRIP: 7.5 [PH] (ref 5–8)
PLATELET # BLD AUTO: 244 10*3/MM3 (ref 140–500)
POTASSIUM SERPL-SCNC: 4.4 MMOL/L (ref 3.5–5.2)
PROT SERPL-MCNC: 7.6 G/DL (ref 6–8.5)
PROT UR QL STRIP: NEGATIVE
PSA SERPL-MCNC: 0.64 NG/ML (ref 0–4)
RBC # BLD AUTO: 4.8 10*6/MM3 (ref 4.6–6)
SODIUM SERPL-SCNC: 144 MMOL/L (ref 136–145)
SP GR UR: 1.02 (ref 1–1.03)
T4 FREE SERPL-MCNC: 1.15 NG/DL (ref 0.93–1.7)
TRIGL SERPL-MCNC: 97 MG/DL (ref 0–150)
TSH SERPL DL<=0.005 MIU/L-ACNC: 2.94 MIU/ML (ref 0.27–4.2)
UROBILINOGEN UR STRIP-MCNC: NORMAL MG/DL
VLDLC SERPL CALC-MCNC: 19.4 MG/DL (ref 5–40)
WBC # BLD AUTO: 5.83 10*3/MM3 (ref 4.5–10.7)

## 2018-10-12 ENCOUNTER — OFFICE VISIT (OUTPATIENT)
Dept: INTERNAL MEDICINE | Age: 48
End: 2018-10-12

## 2018-10-12 VITALS
WEIGHT: 211 LBS | HEART RATE: 72 BPM | BODY MASS INDEX: 31.98 KG/M2 | SYSTOLIC BLOOD PRESSURE: 130 MMHG | DIASTOLIC BLOOD PRESSURE: 72 MMHG | TEMPERATURE: 98.4 F | OXYGEN SATURATION: 99 % | HEIGHT: 68 IN

## 2018-10-12 DIAGNOSIS — E66.9 OBESITY (BMI 30-39.9): Chronic | ICD-10-CM

## 2018-10-12 DIAGNOSIS — E78.2 MIXED HYPERLIPIDEMIA: Chronic | ICD-10-CM

## 2018-10-12 DIAGNOSIS — I10 ESSENTIAL HYPERTENSION: Chronic | ICD-10-CM

## 2018-10-12 DIAGNOSIS — Z00.00 ENCOUNTER FOR ANNUAL HEALTH EXAMINATION: Primary | ICD-10-CM

## 2018-10-12 PROBLEM — F32.9 REACTIVE DEPRESSION (SITUATIONAL): Status: RESOLVED | Noted: 2017-10-12 | Resolved: 2018-10-12

## 2018-10-12 PROCEDURE — 99396 PREV VISIT EST AGE 40-64: CPT | Performed by: INTERNAL MEDICINE

## 2018-10-12 PROCEDURE — 99213 OFFICE O/P EST LOW 20 MIN: CPT | Performed by: INTERNAL MEDICINE

## 2018-10-12 PROCEDURE — 90471 IMMUNIZATION ADMIN: CPT | Performed by: INTERNAL MEDICINE

## 2018-10-12 PROCEDURE — 90472 IMMUNIZATION ADMIN EACH ADD: CPT | Performed by: INTERNAL MEDICINE

## 2018-10-12 PROCEDURE — 90632 HEPA VACCINE ADULT IM: CPT | Performed by: INTERNAL MEDICINE

## 2018-10-12 PROCEDURE — 90674 CCIIV4 VAC NO PRSV 0.5 ML IM: CPT | Performed by: INTERNAL MEDICINE

## 2018-10-12 NOTE — PROGRESS NOTES
[facility] [place] INTERNAL MEDICINE  LATONIA NAVARRO M.D.      Jackson DOTSON Stewart / 47 y.o. / male  10/12/2018    CC: Annual Exam      HPI:      Jackson presents for annual health maintenance visit.  > 6 months since last follow-up for chronic active problems.     Chronic essential hypertension:  Since prior visit: compliant with medication(s), does not check blood pressure at home and denies significant problems with medication(s).  Most recent in-office blood pressure readings:   BP Readings from Last 3 Encounters:   10/12/18 130/72   04/12/18 126/80   10/12/17 124/70     Chronic hyperlipidemia:  Current therapy include rosuvastatin, denies problems with medication.    Most recent labs:   Lab Results   Component Value Date    LDL 76 10/08/2018    LDL 83 07/13/2018     (H) 04/12/2018    HDL 52 10/08/2018    HDL 45 07/13/2018    HDL 54 04/12/2018    TRIG 97 10/08/2018    CHOLHDLRATIO 2.83 10/08/2018    CHOLHDLRATIO 3.31 07/13/2018    CHOLHDLRATIO 4.06 04/12/2018     Obesity: weight has been increasing.     · Last health maintenance visit: 1 year ago  · General health: poor  · Lifestyle:  · Attempting to lose weight?: No   · Diet: needs significant improvement  · Exercise: could be better  · Tobacco: Never used   · Alcohol: occasional/rare  · Work: Full-time  · Reproductive health:  · Sexually active?: No   · Concern for STD?: No   · Sexual problems?: No problems   · Sees Urologist?: No   · Depression Screening:      PHQ-2/PHQ-9 Depression Screening 10/12/2017   Little interest or pleasure in doing things 1   Feeling down, depressed, or hopeless 1   Trouble falling or staying asleep, or sleeping too much 1   Feeling tired or having little energy 1   Poor appetite or overeating 0   Feeling bad about yourself - or that you are a failure or have let yourself or your family down 0   Trouble concentrating on things, such as reading the newspaper or watching television 0   Moving or speaking so slowly that other people could have noticed. Or  the opposite - being so fidgety or restless that you have been moving around a lot more than usual 0   Thoughts that you would be better off dead, or of hurting yourself in some way 0   Total Score 4   If you checked off any problems, how difficult have these problems made it for you to do your work, take care of things at home, or get along with other people? Somewhat difficult         PHQ-2: 0 (Not depressed)     PHQ-9:     Patient Care Team:  Kelby Seo MD as PCP - General (Internal Medicine)  ______________________________________________________________________    ALLERGIES  No Known Allergies     MEDICATIONS  Current Outpatient Prescriptions   Medication Sig Dispense Refill   • amLODIPine (NORVASC) 10 MG tablet TAKE 1 TABLET BY MOUTH DAILY 30 tablet 2   • fexofenadine (ALLEGRA ALLERGY) 180 MG tablet Take 1 tablet by mouth Daily.     • fluticasone (FLONASE) 50 MCG/ACT nasal spray 2 sprays into each nostril daily. Administer 2 sprays in each nostril for each dose.     • Multiple Vitamin (MULTI-VITAMIN) tablet Take 1 tablet by mouth daily.     • olmesartan (BENICAR) 40 MG tablet TAKE 1 TABLET BY MOUTH DAILY 30 tablet 5   • rosuvastatin (CRESTOR) 10 MG tablet TAKE 1 TABLET BY MOUTH EVERY NIGHT 30 tablet 5     No current facility-administered medications for this visit.        PFSH:     The following portions of the patient's history were reviewed and updated as appropriate: Allergies / Current Medications / Past Medical History / Surgical History / Social History / Family History    PROBLEM LIST   Patient Active Problem List   Diagnosis   • Atopic rhinitis   • Anemia   • Arthralgia of multiple joints   • Carpal tunnel syndrome   • Hyperlipidemia   • Hypertension   • Elevated hemoglobin A1c   • Snoring   • Vitamin D deficiency   • Malignant melanoma (CMS/HCC)   • Patellofemoral disorder of right knee   • Obesity (BMI 30-39.9)       PAST MEDICAL HISTORY  Past Medical History:   Diagnosis Date   • Hyperlipidemia     • Hypertension    • Seasonal allergies    • Vitamin D deficiency        SURGICAL HISTORY  Past Surgical History:   Procedure Laterality Date   • MOLE REMOVAL     • WRIST SURGERY      RIGHT-TENDON REPAIR       SOCIAL HISTORY  Social History     Social History   • Marital status: Single   • Number of children: 0     Occupational History   • construction      Social History Main Topics   • Smoking status: Never Smoker   • Smokeless tobacco: Never Used   • Alcohol use 0.6 oz/week     1 Shots of liquor per week      Comment: OCCASIONALLY   • Drug use: No   • Sexual activity: Not Currently     Other Topics Concern   • Not on file       FAMILY HISTORY  Family History   Problem Relation Age of Onset   • Breast cancer Mother    • Hypertension Father    • Hypertension Brother    • Hypertension Brother    • Lung cancer Maternal Grandfather         smoker   • Heart failure Paternal Grandfather    • Heart attack Paternal Uncle 80   • Prostate cancer Neg Hx    • Colon cancer Neg Hx        IMMUNIZATION HISTORY  Immunization History   Administered Date(s) Administered   • Flu Mist 10/12/2017   • Flu Vaccine Quad PF >18YRS 12/14/2016   • Flu Vaccine Quad PF >36MO 10/12/2017   • Hepatitis A 10/12/2018   • Influenza Split Preservative Free ID 10/02/2013   • Tdap 12/14/2016   • flucelvax quad pfs =>4 YRS 10/12/2018       ______________________________________________________________________    REVIEW OF SYSTEMS    Review of Systems   Constitutional: Positive for unexpected weight change (gain).   HENT: Negative.    Eyes: Negative.    Respiratory: Negative.    Cardiovascular: Negative.    Gastrointestinal: Negative.    Endocrine: Negative.    Genitourinary: Negative.    Musculoskeletal: Negative.    Skin: Negative.    Allergic/Immunologic: Negative.    Neurological: Negative.    Hematological: Negative.    Psychiatric/Behavioral: Negative.  Dysphoric mood: improved.         VITALS:    Visit Vitals  /72   Pulse 72   Temp 98.4 °F  "(36.9 °C)   Ht 172.7 cm (67.99\")   Wt 95.7 kg (211 lb)   SpO2 99%   BMI 32.09 kg/m²       BP Readings from Last 3 Encounters:   10/12/18 130/72   04/12/18 126/80   10/12/17 124/70     Wt Readings from Last 3 Encounters:   10/12/18 95.7 kg (211 lb)   04/12/18 92.5 kg (204 lb)   10/12/17 94.3 kg (208 lb)      Body mass index is 32.09 kg/m².    PHYSICAL EXAMINATION    Physical Exam   Constitutional: He is oriented to person, place, and time. He appears well-nourished. No distress.   Obese     HENT:   Head: Normocephalic.   Right Ear: External ear normal.   Left Ear: External ear normal.   Nose: Nose normal.   Mouth/Throat: Oropharynx is clear and moist.   Eyes: Pupils are equal, round, and reactive to light. Conjunctivae and EOM are normal. No scleral icterus.   Neck: Normal range of motion. Neck supple. No tracheal deviation present. No thyromegaly present.   Cardiovascular: Normal rate, regular rhythm, normal heart sounds and intact distal pulses.  Exam reveals no gallop and no friction rub.    No murmur heard.  Pulmonary/Chest: Effort normal and breath sounds normal.   Abdominal: Soft. Normal appearance and bowel sounds are normal. He exhibits no distension and no mass. There is no hepatosplenomegaly. There is no tenderness. No hernia.   Genitourinary: Testes normal and penis normal.   Musculoskeletal: Normal range of motion. He exhibits no edema or deformity.   Lymphadenopathy:     He has no cervical adenopathy.     He has no axillary adenopathy.        Right: No inguinal and no supraclavicular adenopathy present.        Left: No inguinal and no supraclavicular adenopathy present.   Neurological: He is alert and oriented to person, place, and time. He has normal reflexes. He displays normal reflexes. No cranial nerve deficit. He exhibits normal muscle tone. Coordination normal.   Skin: Skin is intact. No lesion (Negative for suspicious skin lesions/growths) and no rash noted. No cyanosis or erythema. No pallor. " Nails show no clubbing.   No jaundice  No suspicious skin lesions.    Psychiatric: He has a normal mood and affect. His behavior is normal. Judgment and thought content normal. Cognition and memory are normal.         REVIEWED DATA    Labs:    Lab Results   Component Value Date     10/08/2018    K 4.4 10/08/2018    AST 21 10/08/2018    ALT 33 10/08/2018    BUN 15 10/08/2018    CREATININE 0.89 10/08/2018    CREATININE 0.87 07/13/2018    CREATININE 0.89 04/12/2018    EGFRIFNONA 92 10/08/2018    EGFRIFAFRI 112 10/08/2018       Lab Results   Component Value Date    HGBA1C 5.40 10/08/2018    HGBA1C 5.20 04/05/2017    HGBA1C 5.40 07/07/2016    TSH 2.940 10/08/2018    FREET4 1.15 10/08/2018       Lab Results   Component Value Date    PSA 0.644 10/08/2018    PSA 0.719 04/05/2017    TESTOSTEROTT 564 04/05/2017       Lab Results   Component Value Date    LDL 76 10/08/2018    HDL 52 10/08/2018    TRIG 97 10/08/2018    CHOLHDLRATIO 2.83 10/08/2018       No components found for: [id number]    Lab Results   Component Value Date    WBC 4.50 04/05/2017    HGB 13.9 10/08/2018    MCV 89.8 10/08/2018     10/08/2018       Lab Results   Component Value Date    PROTEIN Negative 10/08/2018    GLUCOSEU Negative 10/08/2018    BLOODU Negative 10/08/2018    NITRITEU Negative 10/08/2018    LEUKOCYTESUR Negative 10/08/2018        No results found for: HEPCVIRUSABY    Imaging:         Medical Tests:       ______________________________________________________________________    ASSESSMENT & PLAN    ANNUAL WELLNESS EXAM / PHYSICAL     Other medical problems addressed today:  Problem List Items Addressed This Visit        High    Hypertension (Chronic)    Overview     Stable. Continue amlodipine 10 mg and olmesartan 40 mg.          Relevant Medications    amLODIPine (NORVASC) 10 MG tablet    olmesartan (BENICAR) 40 MG tablet       Medium    Hyperlipidemia (Chronic)    Overview     Continue rosuvastatin 10 mg daily.          Relevant  Medications    rosuvastatin (CRESTOR) 10 MG tablet       Low    Obesity (BMI 30-39.9) (Chronic)    Overview     Maintain a low sugar/starch/carbohydrate diet and exercise regularly. Wt loss advised.             Other Visit Diagnoses     Encounter for annual health examination    -  Primary    Relevant Orders    Hepatitis A Vaccine Adult IM (Completed)    Flucelvax Quad=>4Years (PFS) (Completed)    Ambulatory Referral For Screening Colonoscopy          Summary/Discussion:           Return in about 6 months (around 4/12/2019) for Hypertension & hyperlipidemia, Obesity.    Future Appointments  Date Time Provider Department Center   4/18/2019 7:45 AM Kelby Seo MD [facility] None         HEALTHCARE MAINTENANCE ISSUES:    Cancer Screening:  · Colon: Initial/Next screening at age: 45 or DUE NOW  · Repeat colonoscopy every 10 years  · Prostate: PSA checked annually but defer JEFF until 50  · Testicular: Recommended monthly self exam  · Skin: Monthly self skin examination, annual exam by health professional  · Lung:   · Other:    Screening Labs & Tests:  · Lab results reviewed & discussed with with patient or orders placed today.  · EKG:  · Vascular Screening:   · DEXA (75+ or risk factors):   · HEP C (If born 0621[birth year] or risk factors): Not indicated    Immunization/Vaccinations (to be given today unless deferred by patient)  · Influenza: Give flu shot today  · Hepatitis A: Administer today and 2nd shot in 6 months  · Tetanus/Pertussis: Up to date  · Pneumovax: Not needed at this time  · Prevnar 13: Not needed at this time  · Shingles: Not needed at this time  · Other:     Lifestyle Counseling:  · Lifestyle Modifications: Attempt to lose weight, Improve dietary compliance, Increase intensity/regularity of aerobic exercise, Maintain a low sugar/carbohydrate diet, Follow a low fat, low cholesterol diet and Make dinner the lightest meal of day  · Safety Issues: Always wear seatbelt, Avoid texting while driving   · Use  sunscreen, regular skin examination  · Recommended annual dental/vision examination.  · Emotional/Stress/Sleep: Reviewed and  given when appropriate      Health Maintenance   Topic Date Due   • COLONOSCOPY  10/12/2018   • LIPID PANEL  10/08/2019   • ANNUAL PHYSICAL  10/13/2019   • TDAP/TD VACCINES (2 - Td) 12/14/2026   • INFLUENZA VACCINE  Completed         **Key Disclaimer:   Much of this encounter note is an electronic transcription/translation of spoken language to printed text. The electronic translation of spoken language may permit erroneous, or at times, nonsensical words or phrases to be inadvertently transcribed. Although I have reviewed the note for such errors, some may still exist.

## 2018-11-02 DIAGNOSIS — I10 ESSENTIAL HYPERTENSION: ICD-10-CM

## 2018-11-05 RX ORDER — AMLODIPINE BESYLATE 10 MG/1
TABLET ORAL
Qty: 30 TABLET | Refills: 5 | Status: SHIPPED | OUTPATIENT
Start: 2018-11-05 | End: 2019-05-15 | Stop reason: SDUPTHER

## 2019-03-10 DIAGNOSIS — I10 ESSENTIAL HYPERTENSION: ICD-10-CM

## 2019-03-10 DIAGNOSIS — E78.2 MIXED HYPERLIPIDEMIA: Chronic | ICD-10-CM

## 2019-03-11 RX ORDER — ROSUVASTATIN CALCIUM 10 MG/1
10 TABLET, COATED ORAL NIGHTLY
Qty: 30 TABLET | Refills: 1 | Status: SHIPPED | OUTPATIENT
Start: 2019-03-11 | End: 2019-05-15 | Stop reason: SDUPTHER

## 2019-03-11 RX ORDER — OLMESARTAN MEDOXOMIL 40 MG/1
TABLET ORAL
Qty: 30 TABLET | Refills: 1 | Status: SHIPPED | OUTPATIENT
Start: 2019-03-11 | End: 2019-04-18

## 2019-04-08 ENCOUNTER — OUTSIDE FACILITY SERVICE (OUTPATIENT)
Dept: SURGERY | Facility: CLINIC | Age: 49
End: 2019-04-08

## 2019-04-08 PROCEDURE — 45378 DIAGNOSTIC COLONOSCOPY: CPT | Performed by: SURGERY

## 2019-04-18 ENCOUNTER — OFFICE VISIT (OUTPATIENT)
Dept: INTERNAL MEDICINE | Age: 49
End: 2019-04-18

## 2019-04-18 VITALS
HEART RATE: 68 BPM | SYSTOLIC BLOOD PRESSURE: 138 MMHG | OXYGEN SATURATION: 98 % | BODY MASS INDEX: 32.13 KG/M2 | HEIGHT: 68 IN | TEMPERATURE: 98.5 F | DIASTOLIC BLOOD PRESSURE: 76 MMHG | WEIGHT: 212 LBS

## 2019-04-18 DIAGNOSIS — E66.9 OBESITY (BMI 30-39.9): Chronic | ICD-10-CM

## 2019-04-18 DIAGNOSIS — E78.2 MIXED HYPERLIPIDEMIA: Chronic | ICD-10-CM

## 2019-04-18 DIAGNOSIS — I10 ESSENTIAL HYPERTENSION: Primary | Chronic | ICD-10-CM

## 2019-04-18 PROCEDURE — 99214 OFFICE O/P EST MOD 30 MIN: CPT | Performed by: INTERNAL MEDICINE

## 2019-04-18 RX ORDER — CANDESARTAN 16 MG/1
16 TABLET ORAL DAILY
Qty: 30 TABLET | Refills: 5 | Status: SHIPPED | OUTPATIENT
Start: 2019-04-18 | End: 2019-10-10 | Stop reason: SDUPTHER

## 2019-04-18 NOTE — PROGRESS NOTES
"[facility] [place] [facility]  LATONIA NAVARRO M.D.      Jackson DOTSON Stewart / 48 y.o. / male  04/18/2019      ASSESSMENT & PLAN:    Problem List Items Addressed This Visit        High    Hypertension - Primary (Chronic)    Current Assessment & Plan     Cannot get olmesartan at pharmacy. Change to candesartan 16 mg qd. Monitor home blood pressure readings.  Continue amlodipine 10 mg qd.          Relevant Medications    amLODIPine (NORVASC) 10 MG tablet    candesartan (ATACAND) 16 MG tablet       Medium    Hyperlipidemia (Chronic)    Overview     Continue rosuvastatin 10 mg daily.          Relevant Medications    rosuvastatin (CRESTOR) 10 MG tablet       Low    Obesity (BMI 30-39.9) (Chronic)    Overview     Maintain a low sugar/starch/carbohydrate diet and exercise regularly. Wt loss advised.               No orders of the defined types were placed in this encounter.    New Medications Ordered This Visit   Medications   • candesartan (ATACAND) 16 MG tablet     Sig: Take 1 tablet by mouth Daily.     Dispense:  30 tablet     Refill:  5       Summary/Discussion:      Return in about 6 months (around 10/18/2019) for Hypertension & hyperlipidemia.  ____________________________________________________________________    MEDICATIONS  Current Outpatient Medications   Medication Sig Dispense Refill   • amLODIPine (NORVASC) 10 MG tablet TAKE 1 TABLET BY MOUTH DAILY 30 tablet 5   • fexofenadine (ALLEGRA ALLERGY) 180 MG tablet Take 1 tablet by mouth Daily.     • fluticasone (FLONASE) 50 MCG/ACT nasal spray 2 sprays into each nostril daily. Administer 2 sprays in each nostril for each dose.     • Multiple Vitamin (MULTI-VITAMIN) tablet Take 1 tablet by mouth daily.     • rosuvastatin (CRESTOR) 10 MG tablet TAKE 1 TABLET BY MOUTH EVERY NIGHT 30 tablet 1   Olmesartan 40 mg * (not taking due to running out)      VITALS    Visit Vitals  /76   Pulse 68   Temp 98.5 °F (36.9 °C)   Ht 172.7 cm (67.99\")   Wt 96.2 kg (212 lb)   SpO2 98%   BMI 32.24 " kg/m²       BP Readings from Last 3 Encounters:   04/18/19 138/76   10/12/18 130/72   04/12/18 126/80     Wt Readings from Last 3 Encounters:   04/18/19 96.2 kg (212 lb)   10/12/18 95.7 kg (211 lb)   04/12/18 92.5 kg (204 lb)      Body mass index is 32.24 kg/m².    CC:  Main reason(s) for today's visit: Follow-up for hypertension     HPI:     Chronic essential hypertension:  Since prior visit: denies significant problems with medication(s).  Most recent in-office blood pressure readings:   BP Readings from Last 3 Encounters:   04/18/19 138/76   10/12/18 130/72   04/12/18 126/80     Chronic hyperlipidemia:  Current therapy include rosuvastatin, denies problems with medication.    Most recent labs:   Lab Results   Component Value Date    LDL 76 10/08/2018    LDL 83 07/13/2018     (H) 04/12/2018    HDL 52 10/08/2018    HDL 45 07/13/2018    TRIG 97 10/08/2018    CHOLHDLRATIO 2.83 10/08/2018     Obesity: Weight has not changed significantly since last visit.  Weight loss efforts: no specific plan(s).  Current Body mass index is 32.24 kg/m².   Wt Readings from Last 3 Encounters:   04/18/19 96.2 kg (212 lb)   10/12/18 95.7 kg (211 lb)   04/12/18 92.5 kg (204 lb)         Patient Care Team:  Kelby Seo MD as PCP - General (Internal Medicine)  ____________________________________________________________________      REVIEW OF SYSTEMS    Review of Systems  As noted per HPI  Constitutional neg  Resp neg  CV neg       PHYSICAL EXAMINATION    Physical Exam  Constitutional  No distress  Cardiovascular Rate  normal . Rhythm: regular . Heart sounds:  normal  Pulmonary/Chest  Effort normal. Breath sounds:  normal  Psychiatric  Alert. Judgment and thought content normal. Mood normal    REVIEWED DATA:    Labs:   Lab Results   Component Value Date     10/08/2018    K 4.4 10/08/2018    AST 21 10/08/2018    ALT 33 10/08/2018    BUN 15 10/08/2018    CREATININE 0.89 10/08/2018    CREATININE 0.87 07/13/2018    CREATININE 0.89  04/12/2018    EGFRIFNONA 92 10/08/2018    EGFRIFAFRI 112 10/08/2018       Lab Results   Component Value Date    HGBA1C 5.40 10/08/2018    HGBA1C 5.20 04/05/2017    HGBA1C 5.40 07/07/2016       Lab Results   Component Value Date    LDL 76 10/08/2018    LDL 83 07/13/2018     (H) 04/12/2018    HDL 52 10/08/2018    HDL 45 07/13/2018    HDL 54 04/12/2018    TRIG 97 10/08/2018    TRIG 105 07/13/2018    TRIG 73 04/12/2018    CHOLHDLRATIO 2.83 10/08/2018    CHOLHDLRATIO 3.31 07/13/2018    CHOLHDLRATIO 4.06 04/12/2018       Lab Results   Component Value Date    TSH 2.940 10/08/2018    FREET4 1.15 10/08/2018          Lab Results   Component Value Date    WBC 5.83 10/08/2018    HGB 13.9 10/08/2018    HGB 14.4 04/05/2017    HGB 15.3 07/07/2016     10/08/2018       Lab Results   Component Value Date    PROTEIN Negative 10/08/2018    GLUCOSEU Negative 10/08/2018    BLOODU Negative 10/08/2018    NITRITEU Negative 10/08/2018    LEUKOCYTESUR Negative 10/08/2018       Imaging:        Medical Tests:        Summary of old records / correspondence / consultant report:        Request outside records:        ALLERGIES  No Known Allergies     PFSH:     The following portions of the patient's history were reviewed and updated as appropriate: Allergies / Current Medications / Past Medical History / Surgical History / Social History / Family History    PROBLEM LIST   Patient Active Problem List   Diagnosis   • Atopic rhinitis   • Anemia   • Arthralgia of multiple joints   • Carpal tunnel syndrome   • Hyperlipidemia   • Hypertension   • Elevated hemoglobin A1c   • Snoring   • Vitamin D deficiency   • Malignant melanoma (CMS/HCC)   • Patellofemoral disorder of right knee   • Obesity (BMI 30-39.9)       PAST MEDICAL HISTORY  Past Medical History:   Diagnosis Date   • Hyperlipidemia    • Hypertension    • Seasonal allergies    • Vitamin D deficiency        SURGICAL HISTORY  Past Surgical History:   Procedure Laterality Date   • MOLE  REMOVAL     • WRIST SURGERY      RIGHT-TENDON REPAIR       SOCIAL HISTORY  Social History     Socioeconomic History   • Marital status: Single     Spouse name: Not on file   • Number of children: 0   • Years of education: Not on file   • Highest education level: Not on file   Occupational History   • Occupation: construction   Tobacco Use   • Smoking status: Never Smoker   • Smokeless tobacco: Never Used   Substance and Sexual Activity   • Alcohol use: Yes     Alcohol/week: 0.6 oz     Types: 1 Shots of liquor per week     Comment: OCCASIONALLY   • Drug use: No   • Sexual activity: Not Currently       FAMILY HISTORY  Family History   Problem Relation Age of Onset   • Breast cancer Mother    • Hypertension Father    • Hypertension Brother    • Hypertension Brother    • Lung cancer Maternal Grandfather         smoker   • Heart failure Paternal Grandfather    • Heart attack Paternal Uncle 80   • Prostate cancer Neg Hx    • Colon cancer Neg Hx          **Dragon Disclaimer:   Much of this encounter note is an electronic transcription/translation of spoken language to printed text. The electronic translation of spoken language may permit erroneous, or at times, nonsensical words or phrases to be inadvertently transcribed. Although I have reviewed the note for such errors, some may still exist.       Template created by Vipul Seo MD

## 2019-04-18 NOTE — ASSESSMENT & PLAN NOTE
Cannot get olmesartan at pharmacy. Change to candesartan 16 mg qd. Monitor home blood pressure readings.  Continue amlodipine 10 mg qd.

## 2019-05-15 DIAGNOSIS — I10 ESSENTIAL HYPERTENSION: ICD-10-CM

## 2019-05-15 DIAGNOSIS — E78.2 MIXED HYPERLIPIDEMIA: Chronic | ICD-10-CM

## 2019-05-16 RX ORDER — ROSUVASTATIN CALCIUM 10 MG/1
10 TABLET, COATED ORAL NIGHTLY
Qty: 30 TABLET | Refills: 0 | Status: SHIPPED | OUTPATIENT
Start: 2019-05-16 | End: 2019-06-15 | Stop reason: SDUPTHER

## 2019-05-16 RX ORDER — AMLODIPINE BESYLATE 10 MG/1
TABLET ORAL
Qty: 30 TABLET | Refills: 0 | Status: SHIPPED | OUTPATIENT
Start: 2019-05-16 | End: 2019-06-15 | Stop reason: SDUPTHER

## 2019-06-15 DIAGNOSIS — I10 ESSENTIAL HYPERTENSION: ICD-10-CM

## 2019-06-15 DIAGNOSIS — E78.2 MIXED HYPERLIPIDEMIA: Chronic | ICD-10-CM

## 2019-06-17 RX ORDER — AMLODIPINE BESYLATE 10 MG/1
TABLET ORAL
Qty: 30 TABLET | Refills: 0 | Status: SHIPPED | OUTPATIENT
Start: 2019-06-17 | End: 2019-07-17 | Stop reason: SDUPTHER

## 2019-06-17 RX ORDER — ROSUVASTATIN CALCIUM 10 MG/1
10 TABLET, COATED ORAL NIGHTLY
Qty: 30 TABLET | Refills: 0 | Status: SHIPPED | OUTPATIENT
Start: 2019-06-17 | End: 2019-07-17 | Stop reason: SDUPTHER

## 2019-07-15 ENCOUNTER — HOSPITAL ENCOUNTER (OUTPATIENT)
Dept: GENERAL RADIOLOGY | Facility: HOSPITAL | Age: 49
Discharge: HOME | End: 2019-07-15

## 2019-07-15 ENCOUNTER — OFFICE VISIT (OUTPATIENT)
Dept: INTERNAL MEDICINE | Age: 49
End: 2019-07-15

## 2019-07-15 ENCOUNTER — HOSPITAL ENCOUNTER (OUTPATIENT)
Dept: GENERAL RADIOLOGY | Facility: HOSPITAL | Age: 49
Discharge: HOME | End: 2019-07-15
Admitting: INTERNAL MEDICINE

## 2019-07-15 VITALS
SYSTOLIC BLOOD PRESSURE: 140 MMHG | HEART RATE: 76 BPM | BODY MASS INDEX: 33.25 KG/M2 | DIASTOLIC BLOOD PRESSURE: 76 MMHG | WEIGHT: 219.4 LBS | RESPIRATION RATE: 18 BRPM | OXYGEN SATURATION: 97 % | HEIGHT: 68 IN | TEMPERATURE: 99.2 F

## 2019-07-15 DIAGNOSIS — S49.91XA INJURY OF RIGHT UPPER EXTREMITY, INITIAL ENCOUNTER: Primary | ICD-10-CM

## 2019-07-15 DIAGNOSIS — M79.601 PAIN OF RIGHT UPPER EXTREMITY: ICD-10-CM

## 2019-07-15 PROCEDURE — 99214 OFFICE O/P EST MOD 30 MIN: CPT | Performed by: INTERNAL MEDICINE

## 2019-07-15 PROCEDURE — 73070 X-RAY EXAM OF ELBOW: CPT

## 2019-07-15 PROCEDURE — 73060 X-RAY EXAM OF HUMERUS: CPT

## 2019-07-16 DIAGNOSIS — S49.91XA INJURY OF RIGHT UPPER EXTREMITY, INITIAL ENCOUNTER: Primary | ICD-10-CM

## 2019-07-16 DIAGNOSIS — S59.901D INJURY OF RIGHT ELBOW, SUBSEQUENT ENCOUNTER: ICD-10-CM

## 2019-07-16 NOTE — PROGRESS NOTES
Call patient with his test result(s) and mail the results to him if MyChart is NOT active.    Xrays of the elbow and humerus did not show any fracture.   As discussed proceed with MRI of the elbow for further evaluation to r/o triceps tendon rupture.   I will place the order in Epic.

## 2019-07-17 DIAGNOSIS — I10 ESSENTIAL HYPERTENSION: ICD-10-CM

## 2019-07-17 DIAGNOSIS — E78.2 MIXED HYPERLIPIDEMIA: Chronic | ICD-10-CM

## 2019-07-18 RX ORDER — ROSUVASTATIN CALCIUM 10 MG/1
10 TABLET, COATED ORAL NIGHTLY
Qty: 30 TABLET | Refills: 5 | Status: SHIPPED | OUTPATIENT
Start: 2019-07-18 | End: 2020-01-17

## 2019-07-18 RX ORDER — AMLODIPINE BESYLATE 10 MG/1
TABLET ORAL
Qty: 30 TABLET | Refills: 5 | Status: SHIPPED | OUTPATIENT
Start: 2019-07-18 | End: 2020-01-17

## 2019-07-22 ENCOUNTER — OFFICE VISIT (OUTPATIENT)
Dept: INTERNAL MEDICINE | Age: 49
End: 2019-07-22

## 2019-07-22 VITALS
DIASTOLIC BLOOD PRESSURE: 72 MMHG | BODY MASS INDEX: 33.49 KG/M2 | TEMPERATURE: 97.9 F | HEIGHT: 68 IN | WEIGHT: 221 LBS | RESPIRATION RATE: 18 BRPM | SYSTOLIC BLOOD PRESSURE: 120 MMHG | HEART RATE: 76 BPM | OXYGEN SATURATION: 97 %

## 2019-07-22 DIAGNOSIS — S59.901D INJURY OF RIGHT ELBOW, SUBSEQUENT ENCOUNTER: Primary | ICD-10-CM

## 2019-07-22 DIAGNOSIS — N52.9 ERECTILE DYSFUNCTION, UNSPECIFIED ERECTILE DYSFUNCTION TYPE: ICD-10-CM

## 2019-07-22 PROBLEM — C43.9 MELANOMA (HCC): Status: ACTIVE | Noted: 2019-07-22

## 2019-07-22 PROCEDURE — 99213 OFFICE O/P EST LOW 20 MIN: CPT | Performed by: INTERNAL MEDICINE

## 2019-07-22 NOTE — PROGRESS NOTES
[facility] [place] [facility]  LATONIA NAVARRO M.D.      Jackson DOTSON Stewart / 48 y.o. / male  07/22/2019      CC:  Main reason(s) for today's visit: INJURY OF RIGHT UPPER EXTREMITY, (1 WEEK FOLLOW UP )      HPI:     Right elbow is doing significantly better. Able to flex the arm. Denies significant pain except with certain sudden movement. Swelling has subsided.   Did not proceed with MRI of elbow.       Patient Care Team:  Kelby Navarro MD as PCP - General (Internal Medicine)    ASSESSMENT & PLAN:    1. Injury of right elbow, subsequent encounter    2. Erectile dysfunction, unspecified erectile dysfunction type      Orders Placed This Encounter   Procedures   • Ambulatory Referral to Physical Therapy Evaluate and treat     No orders of the defined types were placed in this encounter.       Summary/Discussion:  Recommended PT. Referral placed.   If worsening, proceed with MRI.       Next Appointment with me: 10/18/2019    Return for worsening problem or if not improving, Next scheduled follow up.    ____________________________________________________________________    MEDICATIONS  Current Outpatient Medications   Medication Sig Dispense Refill   • amLODIPine (NORVASC) 10 MG tablet TAKE 1 TABLET BY MOUTH DAILY 30 tablet 5   • candesartan (ATACAND) 16 MG tablet Take 1 tablet by mouth Daily. 30 tablet 5   • fexofenadine (ALLEGRA ALLERGY) 180 MG tablet Take 1 tablet by mouth Daily.     • fluticasone (FLONASE) 50 MCG/ACT nasal spray 2 sprays into each nostril daily. Administer 2 sprays in each nostril for each dose.     • Multiple Vitamin (MULTI-VITAMIN) tablet Take 1 tablet by mouth daily.     • rosuvastatin (CRESTOR) 10 MG tablet TAKE 1 TABLET BY MOUTH EVERY NIGHT 30 tablet 5     No current facility-administered medications for this visit.               ____________________________________________________________________      REVIEW OF SYSTEMS    Review of Systems  Right elbow pain is improved  ROM of right elbow is improved  Erectile  "dysfunction     VITALS    Visit Vitals  /72 (BP Location: Left arm, Patient Position: Sitting, Cuff Size: Adult)   Pulse 76   Temp 97.9 °F (36.6 °C) (Oral)   Resp 18   Ht 172.7 cm (67.99\")   Wt 100 kg (221 lb)   SpO2 97%   BMI 33.61 kg/m²       BP Readings from Last 3 Encounters:   07/22/19 120/72   07/15/19 140/76   04/18/19 138/76     Wt Readings from Last 3 Encounters:   07/22/19 100 kg (221 lb)   07/15/19 99.5 kg (219 lb 6.4 oz)   04/18/19 96.2 kg (212 lb)      Body mass index is 33.61 kg/m².    PHYSICAL EXAMINATION    Physical Exam   Musculoskeletal:        Right elbow: Decreased range of motion: improved. Swelling: decreased. Tenderness: decreased.       REVIEWED DATA:    Labs:       Imaging:        Medical Tests:       Summary of old records / correspondence / consultant report:        Request outside records:       ALLERGIES  No Known Allergies     PFSH:     The following portions of the patient's history were reviewed and updated as appropriate: Allergies / Current Medications / Past Medical History / Surgical History / Social History / Family History    PROBLEM LIST   Patient Active Problem List   Diagnosis   • Atopic rhinitis   • Anemia   • Arthralgia of multiple joints   • Carpal tunnel syndrome   • Hyperlipidemia   • Hypertension   • Elevated hemoglobin A1c   • Snoring   • Vitamin D deficiency   • Malignant melanoma (CMS/HCC)   • Patellofemoral disorder of right knee   • Obesity (BMI 30-39.9)   • Melanoma (CMS/HCC)   • Erectile dysfunction       PAST MEDICAL HISTORY  Past Medical History:   Diagnosis Date   • Hyperlipidemia    • Hypertension    • Seasonal allergies    • Vitamin D deficiency        SURGICAL HISTORY  Past Surgical History:   Procedure Laterality Date   • MOLE REMOVAL     • WRIST SURGERY      RIGHT-TENDON REPAIR       SOCIAL HISTORY  Social History     Socioeconomic History   • Marital status: Single     Spouse name: Not on file   • Number of children: 0   • Years of education: Not " on file   • Highest education level: Not on file   Occupational History   • Occupation: construction   Tobacco Use   • Smoking status: Never Smoker   • Smokeless tobacco: Never Used   Substance and Sexual Activity   • Alcohol use: Yes     Alcohol/week: 0.6 oz     Types: 1 Shots of liquor per week     Comment: OCCASIONALLY   • Drug use: No   • Sexual activity: Not Currently       FAMILY HISTORY  Family History   Problem Relation Age of Onset   • Breast cancer Mother    • Hypertension Father    • Hypertension Brother    • Hypertension Brother    • Lung cancer Maternal Grandfather         smoker   • Heart failure Paternal Grandfather    • Heart attack Paternal Uncle 80   • Prostate cancer Neg Hx    • Colon cancer Neg Hx          **Dragon Disclaimer:   Much of this encounter note is an electronic transcription/translation of spoken language to printed text. The electronic translation of spoken language may permit erroneous, or at times, nonsensical words or phrases to be inadvertently transcribed. Although I have reviewed the note for such errors, some may still exist.       Template created by Vipul Seo MD

## 2019-09-12 ENCOUNTER — TELEPHONE (OUTPATIENT)
Dept: INTERNAL MEDICINE | Age: 49
End: 2019-09-12

## 2019-09-12 DIAGNOSIS — N52.9 ERECTILE DYSFUNCTION, UNSPECIFIED ERECTILE DYSFUNCTION TYPE: ICD-10-CM

## 2019-09-12 DIAGNOSIS — N52.9 ERECTILE DYSFUNCTION, UNSPECIFIED ERECTILE DYSFUNCTION TYPE: Primary | ICD-10-CM

## 2019-09-12 RX ORDER — SILDENAFIL CITRATE 20 MG/1
TABLET ORAL
Qty: 25 TABLET | Refills: 2 | Status: SHIPPED | OUTPATIENT
Start: 2019-09-12 | End: 2022-04-06 | Stop reason: SDUPTHER

## 2019-09-12 NOTE — TELEPHONE ENCOUNTER
Sildenafil 20 mg 3-5 tablets prior to sexual activity (dx: erectile dysfunction)  #25, 2 refills.   Please specify that he will pay out pocket if not covered by insurance.

## 2019-09-12 NOTE — ASSESSMENT & PLAN NOTE
Sildenafil 20 mg 3-5 tablets prior to sexual activity (dx: erectile dysfunction)  #25, 2 refills.   KD

## 2019-09-12 NOTE — TELEPHONE ENCOUNTER
Regarding: Non-Urgent Medical Question  Contact: [phone]  ----- Message from Vizerra, Generic sent at 9/12/2019  8:13 AM EDT -----    Cam Seo, hope all is well.  On one of my previous visits we spoke about a prescription for Sildenafil.  You said at the time you would prescribe that for me but I decided to just wait. At his point I was wondering if I could get it or if I needed to wait till my next visit. I understand if I need to wait till I come back in. Thank You and I'll see you on  Oct. 18.

## 2019-09-12 NOTE — TELEPHONE ENCOUNTER
Rx sent to pharmacy. Pt notified via [facility] of new Rx, as well as possibility that he will have to pay out of pocket.    KD

## 2019-09-13 RX ORDER — SILDENAFIL CITRATE 20 MG/1
TABLET ORAL
Qty: 450 TABLET | Refills: 2 | OUTPATIENT
Start: 2019-09-13

## 2019-10-10 DIAGNOSIS — I10 ESSENTIAL HYPERTENSION: Chronic | ICD-10-CM

## 2019-10-10 RX ORDER — CANDESARTAN 16 MG/1
16 TABLET ORAL DAILY
Qty: 30 TABLET | Refills: 0 | Status: SHIPPED | OUTPATIENT
Start: 2019-10-10 | End: 2019-11-10 | Stop reason: SDUPTHER

## 2019-10-18 ENCOUNTER — OFFICE VISIT (OUTPATIENT)
Dept: INTERNAL MEDICINE | Age: 49
End: 2019-10-18

## 2019-10-18 VITALS
HEART RATE: 68 BPM | HEIGHT: 68 IN | WEIGHT: 216 LBS | DIASTOLIC BLOOD PRESSURE: 82 MMHG | BODY MASS INDEX: 32.74 KG/M2 | TEMPERATURE: 97.8 F | SYSTOLIC BLOOD PRESSURE: 126 MMHG | OXYGEN SATURATION: 97 %

## 2019-10-18 DIAGNOSIS — E66.9 OBESITY (BMI 30-39.9): Chronic | ICD-10-CM

## 2019-10-18 DIAGNOSIS — I10 ESSENTIAL HYPERTENSION: Primary | Chronic | ICD-10-CM

## 2019-10-18 DIAGNOSIS — E78.2 MIXED HYPERLIPIDEMIA: Chronic | ICD-10-CM

## 2019-10-18 LAB
ALBUMIN SERPL-MCNC: 5 G/DL (ref 3.5–5.2)
ALBUMIN/GLOB SERPL: 2.2 G/DL
ALP SERPL-CCNC: 48 U/L (ref 39–117)
ALT SERPL-CCNC: 40 U/L (ref 1–41)
AST SERPL-CCNC: 27 U/L (ref 1–40)
BILIRUB SERPL-MCNC: 0.3 MG/DL (ref 0.2–1.2)
BUN SERPL-MCNC: 17 MG/DL (ref 6–20)
BUN/CREAT SERPL: 18.5 (ref 7–25)
CALCIUM SERPL-MCNC: 9.4 MG/DL (ref 8.6–10.5)
CHLORIDE SERPL-SCNC: 103 MMOL/L (ref 98–107)
CHOLEST SERPL-MCNC: 151 MG/DL (ref 0–200)
CHOLEST/HDLC SERPL: 2.85 {RATIO}
CO2 SERPL-SCNC: 22.9 MMOL/L (ref 22–29)
CREAT SERPL-MCNC: 0.92 MG/DL (ref 0.76–1.27)
GLOBULIN SER CALC-MCNC: 2.3 GM/DL
GLUCOSE SERPL-MCNC: 97 MG/DL (ref 65–99)
HBA1C MFR BLD: 5.5 % (ref 4.8–5.6)
HDLC SERPL-MCNC: 53 MG/DL (ref 40–60)
LDLC SERPL CALC-MCNC: 74 MG/DL (ref 0–100)
POTASSIUM SERPL-SCNC: 4 MMOL/L (ref 3.5–5.2)
PROT SERPL-MCNC: 7.3 G/DL (ref 6–8.5)
SODIUM SERPL-SCNC: 139 MMOL/L (ref 136–145)
TRIGL SERPL-MCNC: 122 MG/DL (ref 0–150)
VLDLC SERPL CALC-MCNC: 24.4 MG/DL

## 2019-10-18 PROCEDURE — 99214 OFFICE O/P EST MOD 30 MIN: CPT | Performed by: INTERNAL MEDICINE

## 2019-10-18 NOTE — ASSESSMENT & PLAN NOTE
BMI Readings from Last 3 Encounters:   10/18/19 32.85 kg/m²   07/22/19 33.61 kg/m²   07/15/19 33.37 kg/m²      Wt Readings from Last 3 Encounters:   10/18/19 98 kg (216 lb)   07/22/19 100 kg (221 lb)   07/15/19 99.5 kg (219 lb 6.4 oz)      Maintain a low sugar/starch/carbohydrate diet and exercise regularly. Wt loss advised.

## 2019-11-10 DIAGNOSIS — I10 ESSENTIAL HYPERTENSION: Chronic | ICD-10-CM

## 2019-11-12 ENCOUNTER — TELEPHONE (OUTPATIENT)
Dept: INTERNAL MEDICINE | Age: 49
End: 2019-11-12

## 2019-11-12 RX ORDER — CANDESARTAN 16 MG/1
16 TABLET ORAL DAILY
Qty: 30 TABLET | Refills: 11 | Status: SHIPPED | OUTPATIENT
Start: 2019-11-12 | End: 2020-07-20

## 2020-01-17 DIAGNOSIS — I10 ESSENTIAL HYPERTENSION: ICD-10-CM

## 2020-01-17 DIAGNOSIS — E78.2 MIXED HYPERLIPIDEMIA: Chronic | ICD-10-CM

## 2020-01-17 RX ORDER — AMLODIPINE BESYLATE 10 MG/1
TABLET ORAL
Qty: 30 TABLET | Refills: 11 | Status: SHIPPED | OUTPATIENT
Start: 2020-01-17 | End: 2021-01-18

## 2020-01-17 RX ORDER — ROSUVASTATIN CALCIUM 10 MG/1
10 TABLET, COATED ORAL NIGHTLY
Qty: 30 TABLET | Refills: 11 | Status: SHIPPED | OUTPATIENT
Start: 2020-01-17 | End: 2021-01-18

## 2020-07-09 DIAGNOSIS — Z12.5 ENCOUNTER FOR SCREENING FOR MALIGNANT NEOPLASM OF PROSTATE: ICD-10-CM

## 2020-07-09 DIAGNOSIS — Z00.00 PREVENTATIVE HEALTH CARE: Primary | ICD-10-CM

## 2020-07-17 DIAGNOSIS — I10 ESSENTIAL HYPERTENSION: Chronic | ICD-10-CM

## 2020-07-20 ENCOUNTER — RESULTS ENCOUNTER (OUTPATIENT)
Dept: INTERNAL MEDICINE | Age: 50
End: 2020-07-20

## 2020-07-20 DIAGNOSIS — Z12.5 ENCOUNTER FOR SCREENING FOR MALIGNANT NEOPLASM OF PROSTATE: ICD-10-CM

## 2020-07-20 DIAGNOSIS — Z00.00 PREVENTATIVE HEALTH CARE: ICD-10-CM

## 2020-07-20 RX ORDER — CANDESARTAN 16 MG/1
16 TABLET ORAL DAILY
Qty: 30 TABLET | Refills: 11 | Status: SHIPPED | OUTPATIENT
Start: 2020-07-20 | End: 2021-07-29

## 2020-07-21 LAB
ALBUMIN SERPL-MCNC: 4.7 G/DL (ref 3.5–5.2)
ALBUMIN/GLOB SERPL: 2.1 G/DL
ALP SERPL-CCNC: 52 U/L (ref 39–117)
ALT SERPL-CCNC: 36 U/L (ref 1–41)
APPEARANCE UR: CLEAR
AST SERPL-CCNC: 24 U/L (ref 1–40)
BASOPHILS # BLD AUTO: 0.06 10*3/MM3 (ref 0–0.2)
BASOPHILS NFR BLD AUTO: 1 % (ref 0–1.5)
BILIRUB SERPL-MCNC: 0.3 MG/DL (ref 0–1.2)
BILIRUB UR QL STRIP: NEGATIVE
BUN SERPL-MCNC: 16 MG/DL (ref 6–20)
BUN/CREAT SERPL: 16.8 (ref 7–25)
CALCIUM SERPL-MCNC: 9.3 MG/DL (ref 8.6–10.5)
CHLORIDE SERPL-SCNC: 102 MMOL/L (ref 98–107)
CHOLEST SERPL-MCNC: 169 MG/DL (ref 0–200)
CHOLEST/HDLC SERPL: 3.02 {RATIO}
CO2 SERPL-SCNC: 23.8 MMOL/L (ref 22–29)
COLOR UR: YELLOW
CREAT SERPL-MCNC: 0.95 MG/DL (ref 0.76–1.27)
EOSINOPHIL # BLD AUTO: 0.24 10*3/MM3 (ref 0–0.4)
EOSINOPHIL NFR BLD AUTO: 4.1 % (ref 0.3–6.2)
ERYTHROCYTE [DISTWIDTH] IN BLOOD BY AUTOMATED COUNT: 13.1 % (ref 12.3–15.4)
GLOBULIN SER CALC-MCNC: 2.2 GM/DL
GLUCOSE SERPL-MCNC: 98 MG/DL (ref 65–99)
GLUCOSE UR QL: NEGATIVE
HBA1C MFR BLD: 5.2 % (ref 4.8–5.6)
HCT VFR BLD AUTO: 41.9 % (ref 37.5–51)
HCV AB S/CO SERPL IA: <0.1 S/CO RATIO (ref 0–0.9)
HDLC SERPL-MCNC: 56 MG/DL (ref 40–60)
HGB BLD-MCNC: 14.5 G/DL (ref 13–17.7)
HGB UR QL STRIP: NEGATIVE
IMM GRANULOCYTES # BLD AUTO: 0.01 10*3/MM3 (ref 0–0.05)
IMM GRANULOCYTES NFR BLD AUTO: 0.2 % (ref 0–0.5)
KETONES UR QL STRIP: NEGATIVE
LDLC SERPL CALC-MCNC: 67 MG/DL (ref 0–100)
LEUKOCYTE ESTERASE UR QL STRIP: NEGATIVE
LYMPHOCYTES # BLD AUTO: 2.09 10*3/MM3 (ref 0.7–3.1)
LYMPHOCYTES NFR BLD AUTO: 35.5 % (ref 19.6–45.3)
MCH RBC QN AUTO: 30.7 PG (ref 26.6–33)
MCHC RBC AUTO-ENTMCNC: 34.6 G/DL (ref 31.5–35.7)
MCV RBC AUTO: 88.6 FL (ref 79–97)
MONOCYTES # BLD AUTO: 0.41 10*3/MM3 (ref 0.1–0.9)
MONOCYTES NFR BLD AUTO: 7 % (ref 5–12)
NEUTROPHILS # BLD AUTO: 3.08 10*3/MM3 (ref 1.7–7)
NEUTROPHILS NFR BLD AUTO: 52.2 % (ref 42.7–76)
NITRITE UR QL STRIP: NEGATIVE
NRBC BLD AUTO-RTO: 0 /100 WBC (ref 0–0.2)
PH UR STRIP: 6.5 [PH] (ref 5–8)
PLATELET # BLD AUTO: 245 10*3/MM3 (ref 140–450)
POTASSIUM SERPL-SCNC: 4.2 MMOL/L (ref 3.5–5.2)
PROT SERPL-MCNC: 6.9 G/DL (ref 6–8.5)
PROT UR QL STRIP: ABNORMAL
PSA SERPL-MCNC: 0.79 NG/ML (ref 0–4)
RBC # BLD AUTO: 4.73 10*6/MM3 (ref 4.14–5.8)
SODIUM SERPL-SCNC: 138 MMOL/L (ref 136–145)
SP GR UR: 1.02 (ref 1–1.03)
T4 FREE SERPL-MCNC: 1.03 NG/DL (ref 0.93–1.7)
TRIGL SERPL-MCNC: 229 MG/DL (ref 0–150)
TSH SERPL DL<=0.005 MIU/L-ACNC: 4.25 UIU/ML (ref 0.27–4.2)
UROBILINOGEN UR STRIP-MCNC: ABNORMAL MG/DL
VLDLC SERPL CALC-MCNC: 45.8 MG/DL
WBC # BLD AUTO: 5.89 10*3/MM3 (ref 3.4–10.8)

## 2020-07-29 ENCOUNTER — OFFICE VISIT (OUTPATIENT)
Dept: INTERNAL MEDICINE | Age: 50
End: 2020-07-29

## 2020-07-29 VITALS
HEIGHT: 68 IN | SYSTOLIC BLOOD PRESSURE: 134 MMHG | TEMPERATURE: 97.4 F | OXYGEN SATURATION: 99 % | WEIGHT: 218 LBS | DIASTOLIC BLOOD PRESSURE: 80 MMHG | HEART RATE: 72 BPM | BODY MASS INDEX: 33.04 KG/M2

## 2020-07-29 DIAGNOSIS — I10 ESSENTIAL HYPERTENSION: Chronic | ICD-10-CM

## 2020-07-29 DIAGNOSIS — E78.2 MIXED HYPERLIPIDEMIA: Chronic | ICD-10-CM

## 2020-07-29 DIAGNOSIS — R94.6 ABNORMAL THYROID FUNCTION TEST: ICD-10-CM

## 2020-07-29 DIAGNOSIS — Z00.00 ENCOUNTER FOR ANNUAL HEALTH EXAMINATION: Primary | ICD-10-CM

## 2020-07-29 DIAGNOSIS — E66.9 OBESITY (BMI 30-39.9): Chronic | ICD-10-CM

## 2020-07-29 PROCEDURE — 99396 PREV VISIT EST AGE 40-64: CPT | Performed by: INTERNAL MEDICINE

## 2020-07-29 PROCEDURE — 99213 OFFICE O/P EST LOW 20 MIN: CPT | Performed by: INTERNAL MEDICINE

## 2020-07-29 PROCEDURE — 90632 HEPA VACCINE ADULT IM: CPT | Performed by: INTERNAL MEDICINE

## 2020-07-29 PROCEDURE — 90471 IMMUNIZATION ADMIN: CPT | Performed by: INTERNAL MEDICINE

## 2020-07-29 NOTE — PATIENT INSTRUCTIONS
** IMPORTANT MESSAGE FROM DR. NAVARRO **    In our office, your satisfaction is VERY important to us.     You may receive a survey from [facility] by mail or E-mail for you to provide feedback about your visit. This information is invaluable for me to know what we can do to improve our services.     I ask that you please take a few minutes to complete the survey and let us know how we are doing in serving your needs. (You may receive the survey more than once for multiple visits)    Thank You !    Dr. Navarro & Staff    _________________________________________________________________________________________________________________________      ** ADDITIONAL INSTRUCTION / REMINDERS FROM DR. NAVARRO **

## 2020-07-29 NOTE — ASSESSMENT & PLAN NOTE
Triglycerides is higher. Continue rosuvastatin 10 mg.   Maintain low fat/cholesterol diet.  Wt loss advised.     Lab Results   Component Value Date    LDL 67 07/20/2020    LDL 74 10/18/2019    LDL 76 10/08/2018    HDL 56 07/20/2020    TRIG 229 (H) 07/20/2020    CHOLHDLRATIO 3.02 07/20/2020

## 2020-07-29 NOTE — PROGRESS NOTES
"[facility] [place] [facility]  LATONIA NAVARRO M.D.      Jackson DOTSON Stewart / 49 y.o. / male  07/29/2020    VITALS     Visit Vitals  /80   Pulse 72   Temp 97.4 °F (36.3 °C)   Ht 172.7 cm (67.99\")   Wt 98.9 kg (218 lb)   SpO2 99%   BMI 33.16 kg/m²       BP Readings from Last 3 Encounters:   07/29/20 134/80   10/18/19 126/82   07/22/19 120/72     Wt Readings from Last 3 Encounters:   07/29/20 98.9 kg (218 lb)   10/18/19 98 kg (216 lb)   07/22/19 100 kg (221 lb)      Body mass index is 33.16 kg/m².    MEDICATIONS     Current Outpatient Medications   Medication Sig Dispense Refill   • amLODIPine (NORVASC) 10 MG tablet TAKE 1 TABLET BY MOUTH DAILY 30 tablet 11   • candesartan (ATACAND) 16 MG tablet TAKE 1 TABLET BY MOUTH DAILY 30 tablet 11   • fexofenadine (ALLEGRA ALLERGY) 180 MG tablet Take 1 tablet by mouth Daily.     • fluticasone (FLONASE) 50 MCG/ACT nasal spray 2 sprays into each nostril daily. Administer 2 sprays in each nostril for each dose.     • Multiple Vitamin (MULTI-VITAMIN) tablet Take 1 tablet by mouth daily.     • rosuvastatin (CRESTOR) 10 MG tablet TAKE 1 TABLET BY MOUTH EVERY NIGHT 30 tablet 11   • sildenafil (REVATIO) 20 MG tablet Take 3-5 tablets prior to sexual activity. 25 tablet 2     No current facility-administered medications for this visit.        _____________________________________________________________________________________    CHIEF COMPLAINT     Annual Exam (10/2018 last cpe); Hypertension; and Hyperlipidemia      HISTORY OF PRESENT ILLNESS    Examiner was wearing KN95 mask and face shield during the entire duration of the visit.   Patient was masked the entire time.   Minimum social distance of 6 ft maintained entire visit except if physical contact was necessary as documented.     Jackson presents for annual health maintenance visit.    > 8 months since last visit for active medical problems.     Hypertension: on amlodipine and candesartan without significant problems   Hyperlipidemia: on " rosuvastatin 10 mg without problems.   Obesity: no significant wt improvement. Eats too late and snacks. Stays very active for work during daytime.     · Last health maintenance visit: approximately 2 years ago  · General health: multiple medical problems  · Lifestyle:  · Attempting to lose weight?: No   · Diet: eats heavier dinner, eats too late and snacks excessively  · Exercise: does not exercise and very active at work  · Tobacco: Never used   · Alcohol: regular (light)  · Work: Full-time  · Reproductive health:  · Sexually active?: No   · Concern for STD?: No   · Sexual problems?: erectile dysfunction   · Sees Urologist?: No   · Depression Screening:      PHQ-2/PHQ-9 Depression Screening 7/29/2020   Little interest or pleasure in doing things 0   Feeling down, depressed, or hopeless 0   Trouble falling or staying asleep, or sleeping too much -   Feeling tired or having little energy -   Poor appetite or overeating -   Feeling bad about yourself - or that you are a failure or have let yourself or your family down -   Trouble concentrating on things, such as reading the newspaper or watching television -   Moving or speaking so slowly that other people could have noticed. Or the opposite - being so fidgety or restless that you have been moving around a lot more than usual -   Thoughts that you would be better off dead, or of hurting yourself in some way -   Total Score 0   If you checked off any problems, how difficult have these problems made it for you to do your work, take care of things at home, or get along with other people? -         PHQ-2: 0 (Not depressed)     PHQ-9: 0 (Negative screening for depression)    Patient Care Team:  Kelby Seo MD as PCP - General (Internal Medicine)  ______________________________________________________________________    ALLERGIES  No Known Allergies     PFSH:     The following portions of the patient's history were reviewed and updated as appropriate: Allergies / Current  Medications / Past Medical History / Surgical History / Social History / Family History    PROBLEM LIST   Patient Active Problem List   Diagnosis   • Atopic rhinitis   • Anemia   • Arthralgia of multiple joints   • Carpal tunnel syndrome   • Hyperlipidemia   • Hypertension   • Elevated hemoglobin A1c   • Snoring   • Vitamin D deficiency   • Malignant melanoma (CMS/HCC)   • Patellofemoral disorder of right knee   • Obesity (BMI 30-39.9)   • Melanoma (CMS/HCC)   • Erectile dysfunction   • Abnormal thyroid function test       PAST MEDICAL HISTORY  Past Medical History:   Diagnosis Date   • Hyperlipidemia    • Hypertension    • Seasonal allergies    • Vitamin D deficiency        SURGICAL HISTORY  Past Surgical History:   Procedure Laterality Date   • DENTAL PROCEDURE  06/18/2020    sinus lift   • MOLE REMOVAL     • WRIST SURGERY      RIGHT-TENDON REPAIR       SOCIAL HISTORY  Social History     Socioeconomic History   • Marital status: Single     Spouse name: Not on file   • Number of children: 0   • Years of education: Not on file   • Highest education level: Not on file   Occupational History   • Occupation: construction   Tobacco Use   • Smoking status: Never Smoker   • Smokeless tobacco: Never Used   Substance and Sexual Activity   • Alcohol use: Yes     Alcohol/week: 1.0 standard drinks     Types: 1 Shots of liquor per week     Frequency: 4 or more times a week     Comment: OCCASIONALLY   • Drug use: No   • Sexual activity: Not Currently       FAMILY HISTORY  Family History   Problem Relation Age of Onset   • Breast cancer Mother    • Hypertension Father    • Hypertension Brother    • Hypertension Brother    • Lung cancer Maternal Grandfather         smoker   • Heart failure Paternal Grandfather    • Heart attack Paternal Uncle 80   • Prostate cancer Neg Hx    • Colon cancer Neg Hx    • Diabetes Neg Hx        IMMUNIZATION HISTORY  Immunization History   Administered Date(s) Administered   • Flu Mist 10/12/2017   •  Flu Vaccine Quad PF 6-35MO 10/12/2017   • Flu Vaccine Quad PF >18YRS 12/14/2016   • Flu Vaccine Quad PF >36MO 10/12/2017   • Hepatitis A 10/12/2018, 07/29/2020   • Influenza Split Preservative Free ID 10/02/2013   • Influenza, Unspecified 10/12/2018   • Tdap 12/14/2016   • flucelvax quad pfs =>4 YRS 10/12/2018       ______________________________________________________________________    REVIEW OF SYSTEMS     Review of Systems   Constitutional: Negative.    HENT: Negative.    Eyes: Negative.    Respiratory: Negative.    Cardiovascular: Negative.    Gastrointestinal: Negative.    Endocrine: Negative.    Genitourinary: Negative.         ED   Musculoskeletal: Negative.  Negative for myalgias.   Skin: Negative.    Allergic/Immunologic: Negative.    Neurological: Negative.    Hematological: Negative.    Psychiatric/Behavioral: Negative.          PHYSICAL EXAMINATION     Physical Exam   Constitutional: He is oriented to person, place, and time. He appears well-developed and well-nourished. No distress.   Overweight    HENT:   Head: Normocephalic and atraumatic.   Right Ear: External ear normal.   Left Ear: External ear normal.   Eyes: Pupils are equal, round, and reactive to light. Conjunctivae and EOM are normal. No scleral icterus.   Neck: Normal range of motion. Neck supple. No tracheal deviation present. No thyroid mass and no thyromegaly present.   Cardiovascular: Normal rate, regular rhythm, normal heart sounds and intact distal pulses.   No carotid bruit   Pulmonary/Chest: Effort normal and breath sounds normal.   Abdominal: Soft. Normal appearance and bowel sounds are normal. He exhibits no distension and no mass. There is no hepatosplenomegaly. There is no tenderness. No hernia.   Musculoskeletal: Normal range of motion.   Lymphadenopathy:     He has no cervical adenopathy.     He has no axillary adenopathy.        Right: No inguinal and no supraclavicular adenopathy present.        Left: No inguinal and no  supraclavicular adenopathy present.   Neurological: He is alert and oriented to person, place, and time. He has normal reflexes. No cranial nerve deficit. He exhibits normal muscle tone.   Skin: Skin is warm. No lesion (Negative for suspicious skin lesions/growths) and no rash noted. No pallor.   No jaundice  No suspicious skin lesions.    Psychiatric: He has a normal mood and affect. His behavior is normal. Judgment and thought content normal.       REVIEWED DATA      Labs:    Lab Results   Component Value Date     07/20/2020    K 4.2 07/20/2020    CALCIUM 9.3 07/20/2020    AST 24 07/20/2020    ALT 36 07/20/2020    BUN 16 07/20/2020    CREATININE 0.95 07/20/2020    CREATININE 0.92 10/18/2019    CREATININE 0.89 10/08/2018    EGFRIFNONA 85 07/20/2020    EGFRIFAFRI 103 07/20/2020       Lab Results   Component Value Date    GLU 98 07/20/2020    HGBA1C 5.20 07/20/2020    HGBA1C 5.50 10/18/2019    HGBA1C 5.40 10/08/2018    TSH 4.250 (H) 07/20/2020    FREET4 1.03 07/20/2020       Lab Results   Component Value Date    PSA 0.786 07/20/2020    PSA 0.644 10/08/2018    PSA 0.719 04/05/2017    TESTOSTEROTT 564 04/05/2017       Lab Results   Component Value Date    LDL 67 07/20/2020    HDL 56 07/20/2020    TRIG 229 (H) 07/20/2020    CHOLHDLRATIO 3.02 07/20/2020       No components found for: [id number]    Lab Results   Component Value Date    WBC 5.89 07/20/2020    HGB 14.5 07/20/2020    MCV 88.6 07/20/2020     07/20/2020       Lab Results   Component Value Date    PROTEIN Trace (A) 07/20/2020    GLUCOSEU Negative 07/20/2020    BLOODU Negative 07/20/2020    NITRITEU Negative 07/20/2020    LEUKOCYTESUR Negative 07/20/2020          Lab Results   Component Value Date    HEPCVIRUSABY <0.1 07/20/2020       Imaging:           Medical Tests:       ______________________________________________________________________    ASSESSMENT & PLAN     ANNUAL WELLNESS EXAM / PHYSICAL     Other medical problems addressed today:  Problem  List Items Addressed This Visit        High    Hypertension (Chronic)    Current Assessment & Plan     Continue candesartan 16 mg and amlodipine 10 mg qd.          Relevant Medications    amLODIPine (NORVASC) 10 MG tablet    candesartan (ATACAND) 16 MG tablet    Abnormal thyroid function test (Chronic)    Current Assessment & Plan     TSH is high. Recheck 6 months.     Lab Results   Component Value Date    TSH 4.250 (H) 07/20/2020    TSH 2.940 10/08/2018    TSH 3.160 04/12/2018    FREET4 1.03 07/20/2020    FREET4 1.15 10/08/2018    FREET4 1.27 04/12/2018                Medium    Hyperlipidemia (Chronic)    Current Assessment & Plan     Triglycerides is higher. Continue rosuvastatin 10 mg.   Maintain low fat/cholesterol diet.  Wt loss advised.     Lab Results   Component Value Date    LDL 67 07/20/2020    LDL 74 10/18/2019    LDL 76 10/08/2018    HDL 56 07/20/2020    TRIG 229 (H) 07/20/2020    CHOLHDLRATIO 3.02 07/20/2020             Relevant Medications    rosuvastatin (CRESTOR) 10 MG tablet       Low    Obesity (BMI 30-39.9) (Chronic)    Current Assessment & Plan     Maintain a low sugar/starch/carbohydrate diet and exercise regularly. Wt loss advised.      Wt Readings from Last 3 Encounters:   07/29/20 98.9 kg (218 lb)   10/18/19 98 kg (216 lb)   07/22/19 100 kg (221 lb)               Other Visit Diagnoses     Encounter for annual health examination    -  Primary    Relevant Orders    Hepatitis A Vaccine Adult IM (Completed)          Summary/Discussion:     · Primary reason for today's visit was for annual health examination. However above active medical issues also needed to be addressed today.      Next Appointment with me: Visit date not found    Return in about 6 months (around 1/29/2021) for Reassess chronic medical problems.      HEALTHCARE MAINTENANCE ISSUES       Cancer Screening:  · Colon: Initial/Next screening at age: CURRENT  · Repeat colon cancer screening: every 5 years  · Prostate: PSA checked  annually but defer JEFF until 50  · Testicular: Recommended monthly self exam  · Skin: Monthly self skin examination, annual exam by health professional  · Lung: Does not meet criteria for lung cancer screening.   · Other:    Screening Labs & Tests:  · Lab results reviewed & discussed with with patient or orders placed today.  · EKG:  · CV Screening: No special screening needed  · DEXA (75+ or risk factors):   · HEP C (If born [id number] or risk factors): Negative screen  · Other:     Immunization/Vaccinations (to be given today unless deferred by patient)  · Influenza: Recommended annual influenza vaccine  · Hepatitis A: Administer today and had 1st shot but needs to complete series  · Tetanus/Pertussis: Up to date  · Pneumovax: Not needed at this time  · Shingles: Not needed at this time  · Other:     Lifestyle Counseling:  · Lifestyle Modifications: Attempt to lose weight, Improve dietary compliance, Maintain a low sugar/carbohydrate diet, Follow a low fat, low cholesterol diet, Maintain low sodium diet (< 3 gm) for blood pressure, Make dinner the lightest meal of day and Decrease or avoid alcohol intake  · Safety Issues: Always wear seatbelt, Avoid texting while driving   · Use sunscreen, regular skin examination  · Recommended annual dental/vision examination.  · Emotional/Stress/Sleep: Reviewed and  given when appropriate      Health Maintenance   Topic Date Due   • INFLUENZA VACCINE  08/01/2020   • LIPID PANEL  07/20/2021   • ANNUAL PHYSICAL  07/30/2021   • COLONOSCOPY  04/08/2024   • TDAP/TD VACCINES (2 - Td) 12/14/2026   • HEPATITIS C SCREENING  Completed         **Key Disclaimer:   Much of this encounter note is an electronic transcription/translation of spoken language to printed text. The electronic translation of spoken language may permit erroneous, or at times, nonsensical words or phrases to be inadvertently transcribed. Although I have reviewed the note for such errors, some may still exist.        Template created by Vipul Seo MD

## 2020-07-29 NOTE — ASSESSMENT & PLAN NOTE
TSH is high. Recheck 6 months.     Lab Results   Component Value Date    TSH 4.250 (H) 07/20/2020    TSH 2.940 10/08/2018    TSH 3.160 04/12/2018    FREET4 1.03 07/20/2020    FREET4 1.15 10/08/2018    FREET4 1.27 04/12/2018

## 2020-07-29 NOTE — ASSESSMENT & PLAN NOTE
Maintain a low sugar/starch/carbohydrate diet and exercise regularly. Wt loss advised.      Wt Readings from Last 3 Encounters:   07/29/20 98.9 kg (218 lb)   10/18/19 98 kg (216 lb)   07/22/19 100 kg (221 lb)

## 2021-01-18 DIAGNOSIS — E78.2 MIXED HYPERLIPIDEMIA: Chronic | ICD-10-CM

## 2021-01-18 DIAGNOSIS — I10 ESSENTIAL HYPERTENSION: ICD-10-CM

## 2021-01-18 RX ORDER — ROSUVASTATIN CALCIUM 10 MG/1
10 TABLET, COATED ORAL NIGHTLY
Qty: 30 TABLET | Refills: 11 | Status: SHIPPED | OUTPATIENT
Start: 2021-01-18 | End: 2022-01-31

## 2021-01-18 RX ORDER — AMLODIPINE BESYLATE 10 MG/1
TABLET ORAL
Qty: 30 TABLET | Refills: 11 | Status: SHIPPED | OUTPATIENT
Start: 2021-01-18 | End: 2022-01-31

## 2021-01-29 ENCOUNTER — OFFICE VISIT (OUTPATIENT)
Dept: INTERNAL MEDICINE | Age: 51
End: 2021-01-29

## 2021-01-29 VITALS
TEMPERATURE: 96.9 F | SYSTOLIC BLOOD PRESSURE: 128 MMHG | HEART RATE: 77 BPM | BODY MASS INDEX: 33.49 KG/M2 | HEIGHT: 68 IN | OXYGEN SATURATION: 98 % | WEIGHT: 221 LBS | DIASTOLIC BLOOD PRESSURE: 70 MMHG

## 2021-01-29 DIAGNOSIS — E66.9 OBESITY (BMI 30-39.9): Chronic | ICD-10-CM

## 2021-01-29 DIAGNOSIS — R09.89 GLOBUS SENSATION: Primary | ICD-10-CM

## 2021-01-29 DIAGNOSIS — R94.6 ABNORMAL THYROID FUNCTION TEST: Chronic | ICD-10-CM

## 2021-01-29 DIAGNOSIS — I10 ESSENTIAL HYPERTENSION: Chronic | ICD-10-CM

## 2021-01-29 DIAGNOSIS — E78.2 MIXED HYPERLIPIDEMIA: Chronic | ICD-10-CM

## 2021-01-29 PROBLEM — R09.A2 GLOBUS SENSATION: Status: ACTIVE | Noted: 2021-01-29

## 2021-01-29 LAB
ALBUMIN SERPL-MCNC: 4.8 G/DL (ref 3.5–5.2)
ALBUMIN/GLOB SERPL: 2 G/DL
ALP SERPL-CCNC: 51 U/L (ref 39–117)
ALT SERPL-CCNC: 36 U/L (ref 1–41)
AST SERPL-CCNC: 27 U/L (ref 1–40)
BILIRUB SERPL-MCNC: 0.4 MG/DL (ref 0–1.2)
BUN SERPL-MCNC: 13 MG/DL (ref 6–20)
BUN/CREAT SERPL: 14.9 (ref 7–25)
CALCIUM SERPL-MCNC: 9.5 MG/DL (ref 8.6–10.5)
CHLORIDE SERPL-SCNC: 103 MMOL/L (ref 98–107)
CO2 SERPL-SCNC: 26.2 MMOL/L (ref 22–29)
CREAT SERPL-MCNC: 0.87 MG/DL (ref 0.76–1.27)
GLOBULIN SER CALC-MCNC: 2.4 GM/DL
GLUCOSE SERPL-MCNC: 96 MG/DL (ref 65–99)
POTASSIUM SERPL-SCNC: 4.4 MMOL/L (ref 3.5–5.2)
PROT SERPL-MCNC: 7.2 G/DL (ref 6–8.5)
SODIUM SERPL-SCNC: 139 MMOL/L (ref 136–145)
T4 FREE SERPL-MCNC: 1.07 NG/DL (ref 0.93–1.7)
TSH SERPL DL<=0.005 MIU/L-ACNC: 2.3 UIU/ML (ref 0.27–4.2)

## 2021-01-29 PROCEDURE — 99214 OFFICE O/P EST MOD 30 MIN: CPT | Performed by: INTERNAL MEDICINE

## 2021-01-29 RX ORDER — PANTOPRAZOLE SODIUM 40 MG/1
40 TABLET, DELAYED RELEASE ORAL DAILY
Qty: 30 TABLET | Refills: 2 | Status: SHIPPED | OUTPATIENT
Start: 2021-01-29 | End: 2021-05-25 | Stop reason: SDUPTHER

## 2021-01-29 NOTE — ASSESSMENT & PLAN NOTE
Check TSH and Free T4 today.     Lab Results   Component Value Date    TSH 4.250 (H) 07/20/2020    TSH 2.940 10/08/2018    TSH 3.160 04/12/2018    FREET4 1.03 07/20/2020    FREET4 1.15 10/08/2018    FREET4 1.27 04/12/2018

## 2021-01-29 NOTE — ASSESSMENT & PLAN NOTE
Reduce/avoid alcohol. Wt loss of 15 lbs recommended.     Wt Readings from Last 3 Encounters:   01/29/21 100 kg (221 lb)   07/29/20 98.9 kg (218 lb)   10/18/19 98 kg (216 lb)

## 2021-01-29 NOTE — PROGRESS NOTES
I N T E R N A L  M E D I C I N E  J U N O H  K I M,  M D      ENCOUNTER DATE:  01/29/2021    Jackson DOTSON Stewart / 50 y.o. / male      CHIEF COMPLAINT / REASON FOR OFFICE VISIT     Hypertension, Hyperlipidemia, Obesity, ELEVATED A1C, and Erectile Dysfunction      ASSESSMENT & PLAN     Problem List Items Addressed This Visit     Globus sensation - Primary    Current Assessment & Plan     This is a new problem to this examiner.   Trial PPI therapy with pantoprazole 40 mg qd x 2 months, avoid alcohol.   Consider ENT / laryngoscopy if continues.          Relevant Medications    pantoprazole (PROTONIX) 40 MG EC tablet    Hyperlipidemia (Chronic)    Current Assessment & Plan     Continue rosuvastatin 10 mg daily.   Advised to avoid/minimize alcohol, wt loss.          Relevant Medications    rosuvastatin (CRESTOR) 10 MG tablet    Other Relevant Orders    Comprehensive Metabolic Panel    Hypertension (Chronic)    Relevant Medications    candesartan (ATACAND) 16 MG tablet    amLODIPine (NORVASC) 10 MG tablet    Other Relevant Orders    Comprehensive Metabolic Panel    Abnormal thyroid function test (Chronic)    Current Assessment & Plan     Check TSH and Free T4 today.     Lab Results   Component Value Date    TSH 4.250 (H) 07/20/2020    TSH 2.940 10/08/2018    TSH 3.160 04/12/2018    FREET4 1.03 07/20/2020    FREET4 1.15 10/08/2018    FREET4 1.27 04/12/2018             Relevant Orders    TSH+Free T4    Obesity (BMI 30-39.9) (Chronic)    Current Assessment & Plan     Reduce/avoid alcohol. Wt loss of 15 lbs recommended.     Wt Readings from Last 3 Encounters:   01/29/21 100 kg (221 lb)   07/29/20 98.9 kg (218 lb)   10/18/19 98 kg (216 lb)                 Orders Placed This Encounter   Procedures   • TSH+Free T4   • Comprehensive Metabolic Panel     New Medications Ordered This Visit   Medications   • pantoprazole (PROTONIX) 40 MG EC tablet     Sig: Take 1 tablet by mouth Daily.     Dispense:  30 tablet     Refill:  2  "      SUMMARY/DISCUSSION  •       Next Appointment with me: Visit date not found    Return in about 4 months (around 5/29/2021) for Hypertension, Hyperlipidemia, globus.      VITAL SIGNS     Visit Vitals  /70   Pulse 77   Temp 96.9 °F (36.1 °C)   Ht 172.7 cm (67.99\")   Wt 100 kg (221 lb)   SpO2 98%   BMI 33.61 kg/m²       BP Readings from Last 3 Encounters:   01/29/21 128/70   07/29/20 134/80   10/18/19 126/82     Wt Readings from Last 3 Encounters:   01/29/21 100 kg (221 lb)   07/29/20 98.9 kg (218 lb)   10/18/19 98 kg (216 lb)     Body mass index is 33.61 kg/m².        HISTORY OF PRESENT ILLNESS     Globus sensation, no palpable neck mass. No dysphonia or dysphagia.   Snores, somnolence when resting, no prior sleep study. Moderate regular alcohol.   Blood pressure remains stable.   Prior TSH was elevated.   Wt gain noted.         REVIEW OF SYSTEMS           PHYSICAL EXAMINATION     Physical Exam  Obese   Cardiovascular: Normal rate, regular rhythm.  Pulm/Chest: Effort normal, breath sounds normal.   No palpable neck mass       REVIEWED DATA     Labs:     Lab Results   Component Value Date     07/20/2020    K 4.2 07/20/2020    AST 24 07/20/2020    ALT 36 07/20/2020    BUN 16 07/20/2020    CREATININE 0.95 07/20/2020    CREATININE 0.92 10/18/2019    CREATININE 0.89 10/08/2018    EGFRIFNONA 85 07/20/2020    EGFRIFAFRI 103 07/20/2020       Lab Results   Component Value Date    HGBA1C 5.20 07/20/2020    HGBA1C 5.50 10/18/2019    HGBA1C 5.40 10/08/2018       Lab Results   Component Value Date    LDL 67 07/20/2020    LDL 74 10/18/2019    HDL 56 07/20/2020    TRIG 229 (H) 07/20/2020       Lab Results   Component Value Date    TSH 4.250 (H) 07/20/2020    FREET4 1.03 07/20/2020       Lab Results   Component Value Date    WBC 5.89 07/20/2020    HGB 14.5 07/20/2020     07/20/2020         Imaging:           Medical Tests:           Summary of old records / correspondence / consultant report:           Request " outside records:           MEDICATIONS AT THE TIME OF OFFICE VISIT       Current Outpatient Medications on File Prior to Visit   Medication Sig Dispense Refill   • amLODIPine (NORVASC) 10 MG tablet TAKE 1 TABLET BY MOUTH DAILY 30 tablet 11   • candesartan (ATACAND) 16 MG tablet TAKE 1 TABLET BY MOUTH DAILY 30 tablet 11   • fexofenadine (ALLEGRA ALLERGY) 180 MG tablet Take 1 tablet by mouth Daily.     • fluticasone (FLONASE) 50 MCG/ACT nasal spray 2 sprays into each nostril daily. Administer 2 sprays in each nostril for each dose.     • Multiple Vitamin (MULTI-VITAMIN) tablet Take 1 tablet by mouth daily.     • rosuvastatin (CRESTOR) 10 MG tablet TAKE 1 TABLET BY MOUTH EVERY NIGHT 30 tablet 11   • sildenafil (REVATIO) 20 MG tablet Take 3-5 tablets prior to sexual activity. 25 tablet 2     No current facility-administered medications on file prior to visit.           *Examiner was wearing KN95 mask, face shield and exam gloves during the entire duration of the visit. Patient was masked the entire time.   Minimum social distance of 6 ft maintained entire visit except if physical contact was necessary as documented.     **Dragon Disclaimer:   Much of this encounter note is an electronic transcription/translation of spoken language to printed text. The electronic translation of spoken language may permit erroneous, or at times, nonsensical words or phrases to be inadvertently transcribed. Although I have reviewed the note for such errors, some may still exist.     Template created by Vipul Seo MD

## 2021-01-29 NOTE — PATIENT INSTRUCTIONS
** IMPORTANT MESSAGE FROM DR. NAVARRO **    In our office, your satisfaction is VERY important to us.     You may receive a survey from [facility] by mail or E-mail for you to provide feedback about your visit. This information is invaluable for me to know what we can do to improve our services.     I ask that you please take a few minutes to complete the survey and let us know how we are doing in serving your needs. (You may receive the survey more than once for multiple visits)    Thank You !    Dr. Navarro & Staff    _________________________________________________________________________________________________________________________      ** ADDITIONAL INSTRUCTION / REMINDERS FROM DR. NAVARRO **

## 2021-01-29 NOTE — ASSESSMENT & PLAN NOTE
This is a new problem to this examiner.   Trial PPI therapy with pantoprazole 40 mg qd x 2 months, avoid alcohol.   Consider ENT / laryngoscopy if continues.

## 2021-04-02 ENCOUNTER — BULK ORDERING (OUTPATIENT)
Dept: CASE MANAGEMENT | Facility: OTHER | Age: 51
End: 2021-04-02

## 2021-04-02 DIAGNOSIS — Z23 IMMUNIZATION DUE: ICD-10-CM

## 2021-05-25 DIAGNOSIS — R09.89 GLOBUS SENSATION: ICD-10-CM

## 2021-05-25 RX ORDER — PANTOPRAZOLE SODIUM 40 MG/1
40 TABLET, DELAYED RELEASE ORAL DAILY
Qty: 30 TABLET | Refills: 11 | Status: SHIPPED | OUTPATIENT
Start: 2021-05-25 | End: 2023-01-24

## 2021-06-02 ENCOUNTER — OFFICE VISIT (OUTPATIENT)
Dept: INTERNAL MEDICINE | Age: 51
End: 2021-06-02

## 2021-06-02 VITALS
BODY MASS INDEX: 32.74 KG/M2 | OXYGEN SATURATION: 97 % | HEIGHT: 68 IN | SYSTOLIC BLOOD PRESSURE: 140 MMHG | TEMPERATURE: 98.2 F | HEART RATE: 79 BPM | DIASTOLIC BLOOD PRESSURE: 78 MMHG | WEIGHT: 216 LBS

## 2021-06-02 DIAGNOSIS — I10 ESSENTIAL HYPERTENSION: Primary | Chronic | ICD-10-CM

## 2021-06-02 DIAGNOSIS — L29.0 PERIANAL IRRITATION: ICD-10-CM

## 2021-06-02 DIAGNOSIS — E78.2 MIXED HYPERLIPIDEMIA: Chronic | ICD-10-CM

## 2021-06-02 DIAGNOSIS — R09.89 GLOBUS SENSATION: ICD-10-CM

## 2021-06-02 PROCEDURE — 99214 OFFICE O/P EST MOD 30 MIN: CPT | Performed by: INTERNAL MEDICINE

## 2021-06-02 RX ORDER — NYSTATIN 100000 [USP'U]/G
POWDER TOPICAL 2 TIMES DAILY
Qty: 60 G | Refills: 2 | Status: SHIPPED | OUTPATIENT
Start: 2021-06-02

## 2021-06-02 NOTE — PROGRESS NOTES
"    I N T E R N A L  M E D I C I N E  J U N O H  K I M,  M D      ENCOUNTER DATE:  06/02/2021    Jackson DOTSON Stewart / 50 y.o. / male      CHIEF COMPLAINT / REASON FOR OFFICE VISIT     Hypertension, Hyperlipidemia, Globus, and Perianal irritation      ASSESSMENT & PLAN     Problem List Items Addressed This Visit        High    Hypertension - Primary (Chronic)    Overview     Continue candesartan 16 mg and amlodipine 10 mg qd.          Relevant Medications    candesartan (ATACAND) 16 MG tablet    amLODIPine (NORVASC) 10 MG tablet       Medium    Hyperlipidemia (Chronic)    Overview     Continue rosuvastatin 10 mg qd.          Relevant Medications    rosuvastatin (CRESTOR) 10 MG tablet    Other Relevant Orders    Lipid Panel With / Chol / HDL Ratio    Globus sensation    Current Assessment & Plan     Improved with pantoprazole. Continue daily.          Relevant Medications    pantoprazole (PROTONIX) 40 MG EC tablet       Low    Perianal irritation    Current Assessment & Plan     Avoid wiping excessively with dry toilet paper, use moistened toilet paper, apply Nystatin powder.          Relevant Medications    nystatin (MYCOSTATIN) 431612 UNIT/GM powder        Orders Placed This Encounter   Procedures   • Lipid Panel With / Chol / HDL Ratio     New Medications Ordered This Visit   Medications   • nystatin (MYCOSTATIN) 348309 UNIT/GM powder     Sig: Apply  topically to the appropriate area as directed 2 (Two) Times a Day.     Dispense:  60 g     Refill:  2       SUMMARY/DISCUSSION  •       Next Appointment with me: Visit date not found    Return in about 6 months (around 12/2/2021) for Reassess chronic medical problems.      VITAL SIGNS     Visit Vitals  /78 (BP Location: Left arm)   Pulse 79   Temp 98.2 °F (36.8 °C)   Ht 172.7 cm (67.99\")   Wt 98 kg (216 lb)   SpO2 97%   BMI 32.85 kg/m²       BP Readings from Last 3 Encounters:   06/02/21 140/78   01/29/21 128/70   07/29/20 134/80     Wt Readings from Last 3 " Encounters:   06/02/21 98 kg (216 lb)   01/29/21 100 kg (221 lb)   07/29/20 98.9 kg (218 lb)     Body mass index is 32.85 kg/m².      MEDICATIONS AT THE TIME OF OFFICE VISIT     Current Outpatient Medications on File Prior to Visit   Medication Sig   • amLODIPine (NORVASC) 10 MG tablet TAKE 1 TABLET BY MOUTH DAILY   • candesartan (ATACAND) 16 MG tablet TAKE 1 TABLET BY MOUTH DAILY   • fexofenadine (ALLEGRA ALLERGY) 180 MG tablet Take 1 tablet by mouth Daily.   • fluticasone (FLONASE) 50 MCG/ACT nasal spray 2 sprays into each nostril daily. Administer 2 sprays in each nostril for each dose.   • Multiple Vitamin (MULTI-VITAMIN) tablet Take 1 tablet by mouth daily.   • pantoprazole (PROTONIX) 40 MG EC tablet Take 1 tablet by mouth Daily.   • rosuvastatin (CRESTOR) 10 MG tablet TAKE 1 TABLET BY MOUTH EVERY NIGHT   • sildenafil (REVATIO) 20 MG tablet Take 3-5 tablets prior to sexual activity.     No current facility-administered medications on file prior to visit.          HISTORY OF PRESENT ILLNESS     Perianal irritation from wiping excessively and moisture.   Denies chronic diarrhea or rectal pain or bleeding.   Hypertension remains stable on current medications.   On rosuvastatin for hyperlipidemia without problems.       Patient Care Team:  Kelby Seo MD as PCP - General (Internal Medicine)    REVIEW OF SYSTEMS     Constitutional neg except per HPI   Resp neg  CV neg   GI neg x per HPI       PHYSICAL EXAMINATION     Physical Exam  No acute distress   Perianal irritation noted with excess perspiration       REVIEWED DATA     Labs:     Lab Results   Component Value Date     01/29/2021    K 4.4 01/29/2021    CALCIUM 9.5 01/29/2021    AST 27 01/29/2021    ALT 36 01/29/2021    BUN 13 01/29/2021    CREATININE 0.87 01/29/2021    CREATININE 0.95 07/20/2020    CREATININE 0.92 10/18/2019    EGFRIFNONA 93 01/29/2021    EGFRIFAFRI 113 01/29/2021       Lab Results   Component Value Date    HGBA1C 5.20 07/20/2020     HGBA1C 5.50 10/18/2019    HGBA1C 5.40 10/08/2018       Lab Results   Component Value Date    LDL 67 07/20/2020    LDL 74 10/18/2019    HDL 56 07/20/2020    TRIG 229 (H) 07/20/2020       Lab Results   Component Value Date    TSH 2.300 01/29/2021    TSH 4.250 (H) 07/20/2020    TSH 2.940 10/08/2018    FREET4 1.07 01/29/2021    FREET4 1.03 07/20/2020    FREET4 1.15 10/08/2018       Lab Results   Component Value Date    WBC 5.89 07/20/2020    HGB 14.5 07/20/2020     07/20/2020         Imaging:           Medical Tests:           Summary of old records / correspondence / consultant report:           Request outside records:             *Examiner was wearing KN95 mask during the entire duration of the visit. Patient was masked the entire time.   Minimum social distance of 6 ft maintained entire visit except if physical contact was necessary as documented.     **Dragon Disclaimer:   Much of this encounter note is an electronic transcription/translation of spoken language to printed text. The electronic translation of spoken language may permit erroneous, or at times, nonsensical words or phrases to be inadvertently transcribed. Although I have reviewed the note for such errors, some may still exist.     Template created by Vipul Seo MD

## 2021-06-02 NOTE — ASSESSMENT & PLAN NOTE
Avoid wiping excessively with dry toilet paper, use moistened toilet paper, apply Nystatin powder.

## 2021-06-07 LAB
CHOLEST SERPL-MCNC: 166 MG/DL (ref 0–200)
CHOLEST/HDLC SERPL: 3.13 {RATIO}
HDLC SERPL-MCNC: 53 MG/DL (ref 40–60)
LDLC SERPL CALC-MCNC: 91 MG/DL (ref 0–100)
TRIGL SERPL-MCNC: 125 MG/DL (ref 0–150)
VLDLC SERPL CALC-MCNC: 22 MG/DL (ref 5–40)

## 2021-07-29 DIAGNOSIS — I10 ESSENTIAL HYPERTENSION: Chronic | ICD-10-CM

## 2021-07-29 RX ORDER — CANDESARTAN 16 MG/1
16 TABLET ORAL DAILY
Qty: 30 TABLET | Refills: 11 | Status: SHIPPED | OUTPATIENT
Start: 2021-07-29 | End: 2021-10-06

## 2021-10-05 DIAGNOSIS — I10 ESSENTIAL HYPERTENSION: Chronic | ICD-10-CM

## 2021-10-06 RX ORDER — CANDESARTAN 16 MG/1
16 TABLET ORAL DAILY
Qty: 30 TABLET | Refills: 11 | Status: SHIPPED | OUTPATIENT
Start: 2021-10-06 | End: 2022-10-10

## 2021-12-03 ENCOUNTER — OFFICE VISIT (OUTPATIENT)
Dept: INTERNAL MEDICINE | Age: 51
End: 2021-12-03

## 2021-12-03 VITALS
TEMPERATURE: 98 F | HEART RATE: 79 BPM | DIASTOLIC BLOOD PRESSURE: 80 MMHG | WEIGHT: 227 LBS | OXYGEN SATURATION: 98 % | SYSTOLIC BLOOD PRESSURE: 130 MMHG | HEIGHT: 68 IN | BODY MASS INDEX: 34.4 KG/M2

## 2021-12-03 DIAGNOSIS — E66.9 OBESITY (BMI 30-39.9): Chronic | ICD-10-CM

## 2021-12-03 DIAGNOSIS — R07.89 ANTERIOR CHEST WALL PAIN: ICD-10-CM

## 2021-12-03 DIAGNOSIS — I10 PRIMARY HYPERTENSION: Primary | Chronic | ICD-10-CM

## 2021-12-03 DIAGNOSIS — E78.2 MIXED HYPERLIPIDEMIA: Chronic | ICD-10-CM

## 2021-12-03 DIAGNOSIS — R29.818 SUSPECTED SLEEP APNEA: Chronic | ICD-10-CM

## 2021-12-03 PROBLEM — Z85.820 HISTORY OF MELANOMA: Status: ACTIVE | Noted: 2019-07-22

## 2021-12-03 PROCEDURE — 99214 OFFICE O/P EST MOD 30 MIN: CPT | Performed by: INTERNAL MEDICINE

## 2021-12-03 NOTE — PROGRESS NOTES
"    I N T E R N A L  M E D I C I N E  J U N O H  K I M,  M D      ENCOUNTER DATE:  12/03/2021    Jackson DOTSON Stewart / 51 y.o. / male      CHIEF COMPLAINT / REASON FOR OFFICE VISIT     Hypertension, Hyperlipidemia, Elevated hemoglobin A1c, and Obesity      ASSESSMENT & PLAN     Problem List Items Addressed This Visit        High    Hypertension - Primary (Chronic)    Overview     Continue candesartan 16 mg and amlodipine 10 mg qd.          Relevant Medications    amLODIPine (NORVASC) 10 MG tablet    candesartan (ATACAND) 16 MG tablet    Other Relevant Orders    Comprehensive Metabolic Panel       Medium    Hyperlipidemia (Chronic)    Overview     Continue rosuvastatin 10 mg qd.          Relevant Medications    rosuvastatin (CRESTOR) 10 MG tablet    Anterior chest wall pain    Current Assessment & Plan     Stretches before work every morning. Heat patch PRN. Tylenol if needed.             Low    Suspected sleep apnea (Chronic)    Current Assessment & Plan     Recommended sleep study. He will think about it and get back to me.          Obesity (BMI 30-39.9) (Chronic)    Current Assessment & Plan     Weight gain noted.   Wt Readings from Last 3 Encounters:   12/03/21 103 kg (227 lb)   06/02/21 98 kg (216 lb)   01/29/21 100 kg (221 lb)        Maintain a low sugar/starch/carbohydrate diet and exercise regularly. Wt loss advised.           Relevant Orders    Hemoglobin A1c        Orders Placed This Encounter   Procedures   • Comprehensive Metabolic Panel   • Hemoglobin A1c     No orders of the defined types were placed in this encounter.      SUMMARY/DISCUSSION  •       Next Appointment with me: Visit date not found    Return in about 6 months (around 6/3/2022) for Reassess chronic medical problems.      VITAL SIGNS     Visit Vitals  /80 (BP Location: Left arm)   Pulse 79   Temp 98 °F (36.7 °C)   Ht 172.7 cm (67.99\")   Wt 103 kg (227 lb)   SpO2 98%   BMI 34.53 kg/m²       BP Readings from Last 3 Encounters:   12/03/21 " 130/80   06/02/21 140/78   01/29/21 128/70     Wt Readings from Last 3 Encounters:   12/03/21 103 kg (227 lb)   06/02/21 98 kg (216 lb)   01/29/21 100 kg (221 lb)     Body mass index is 34.53 kg/m².      MEDICATIONS AT THE TIME OF OFFICE VISIT     Current Outpatient Medications on File Prior to Visit   Medication Sig   • amLODIPine (NORVASC) 10 MG tablet TAKE 1 TABLET BY MOUTH DAILY   • candesartan (ATACAND) 16 MG tablet TAKE 1 TABLET BY MOUTH DAILY   • fexofenadine (ALLEGRA ALLERGY) 180 MG tablet Take 1 tablet by mouth Daily.   • fluticasone (FLONASE) 50 MCG/ACT nasal spray 2 sprays into each nostril daily. Administer 2 sprays in each nostril for each dose.   • Multiple Vitamin (MULTI-VITAMIN) tablet Take 1 tablet by mouth daily.   • nystatin (MYCOSTATIN) 339078 UNIT/GM powder Apply  topically to the appropriate area as directed 2 (Two) Times a Day.   • pantoprazole (PROTONIX) 40 MG EC tablet Take 1 tablet by mouth Daily.   • rosuvastatin (CRESTOR) 10 MG tablet TAKE 1 TABLET BY MOUTH EVERY NIGHT   • sildenafil (REVATIO) 20 MG tablet Take 3-5 tablets prior to sexual activity.     No current facility-administered medications on file prior to visit.          HISTORY OF PRESENT ILLNESS     1 month sternal chest soreness, reproducible by palpitation. Right periscapular pain worse with use. Does lot of heavy work, lifting. No exertional chest pain or ROMERO.       Patient Care Team:  Kelby Seo MD as PCP - General (Internal Medicine)    REVIEW OF SYSTEMS     Review of Systems   Weight gain 10 lbs  Anterior chest wall soreness  No angina, ROMERO, palpitations   + snoring and fatigue   No abdominal pain, melena or bleeding.     PHYSICAL EXAMINATION     Physical Exam  Weight gain noted, obese, no acute distress   No anterior chest wall tenderness currently  Cardiovascular: Normal rate, regular rhythm.  No rubs.       REVIEWED DATA     Labs:     Lab Results   Component Value Date     01/29/2021    K 4.4 01/29/2021     CALCIUM 9.5 01/29/2021    AST 27 01/29/2021    ALT 36 01/29/2021    BUN 13 01/29/2021    CREATININE 0.87 01/29/2021    CREATININE 0.95 07/20/2020    CREATININE 0.92 10/18/2019    EGFRIFNONA 93 01/29/2021    EGFRIFAFRI 113 01/29/2021       Lab Results   Component Value Date    HGBA1C 5.20 07/20/2020    HGBA1C 5.50 10/18/2019    HGBA1C 5.40 10/08/2018       Lab Results   Component Value Date    LDL 91 06/07/2021    LDL 67 07/20/2020    HDL 53 06/07/2021    TRIG 125 06/07/2021       Lab Results   Component Value Date    TSH 2.300 01/29/2021    TSH 4.250 (H) 07/20/2020    TSH 2.940 10/08/2018    FREET4 1.07 01/29/2021    FREET4 1.03 07/20/2020    FREET4 1.15 10/08/2018       Lab Results   Component Value Date    WBC 5.89 07/20/2020    HGB 14.5 07/20/2020     07/20/2020     Lab Results   Component Value Date    TESTOSTEROTT 564 04/05/2017     Lab Results   Component Value Date    PSA 0.786 07/20/2020    PSA 0.644 10/08/2018    PSA 0.719 04/05/2017    HCT 41.9 07/20/2020    HCT 43.1 10/08/2018    HCT 42.4 04/05/2017      No results found for: MICROALBUR        Imaging:           Medical Tests:           Summary of old records / correspondence / consultant report:           Request outside records:             *Examiner was wearing KN95 mask and eye protection during the entire duration of the visit. Patient was masked the entire time. Minimum social distance of 6 ft maintained entire visit except if physical contact was necessary as documented.     **Dragon Disclaimer: Much of this encounter note is an electronic transcription/translation of spoken language to printed text. The electronic translation of spoken language may permit erroneous, or at times, nonsensical words or phrases to be inadvertently transcribed. Although I have reviewed the note for such errors, some may still exist.       Template created by Vipul Seo MD

## 2021-12-03 NOTE — ASSESSMENT & PLAN NOTE
Weight gain noted.   Wt Readings from Last 3 Encounters:   12/03/21 103 kg (227 lb)   06/02/21 98 kg (216 lb)   01/29/21 100 kg (221 lb)        Maintain a low sugar/starch/carbohydrate diet and exercise regularly. Wt loss advised.

## 2021-12-04 LAB
ALBUMIN SERPL-MCNC: 4.9 G/DL (ref 4–5)
ALBUMIN/GLOB SERPL: 2 {RATIO} (ref 1.2–2.2)
ALP SERPL-CCNC: 54 IU/L (ref 44–121)
ALT SERPL-CCNC: 37 IU/L (ref 0–44)
AST SERPL-CCNC: 26 IU/L (ref 0–40)
BILIRUB SERPL-MCNC: 0.5 MG/DL (ref 0–1.2)
BUN SERPL-MCNC: 18 MG/DL (ref 6–24)
BUN/CREAT SERPL: 19 (ref 9–20)
CALCIUM SERPL-MCNC: 9.5 MG/DL (ref 8.7–10.2)
CHLORIDE SERPL-SCNC: 102 MMOL/L (ref 96–106)
CO2 SERPL-SCNC: 23 MMOL/L (ref 20–29)
CREAT SERPL-MCNC: 0.96 MG/DL (ref 0.76–1.27)
GLOBULIN SER CALC-MCNC: 2.4 G/DL (ref 1.5–4.5)
GLUCOSE SERPL-MCNC: 106 MG/DL (ref 65–99)
HBA1C MFR BLD: 5.7 % (ref 4.8–5.6)
POTASSIUM SERPL-SCNC: 4.3 MMOL/L (ref 3.5–5.2)
PROT SERPL-MCNC: 7.3 G/DL (ref 6–8.5)
SODIUM SERPL-SCNC: 139 MMOL/L (ref 134–144)

## 2022-01-31 DIAGNOSIS — I10 ESSENTIAL HYPERTENSION: ICD-10-CM

## 2022-01-31 DIAGNOSIS — E78.2 MIXED HYPERLIPIDEMIA: Chronic | ICD-10-CM

## 2022-01-31 RX ORDER — ROSUVASTATIN CALCIUM 10 MG/1
10 TABLET, COATED ORAL NIGHTLY
Qty: 30 TABLET | Refills: 11 | Status: SHIPPED | OUTPATIENT
Start: 2022-01-31 | End: 2023-02-13

## 2022-01-31 RX ORDER — AMLODIPINE BESYLATE 10 MG/1
TABLET ORAL
Qty: 30 TABLET | Refills: 11 | Status: SHIPPED | OUTPATIENT
Start: 2022-01-31 | End: 2023-02-13

## 2022-04-06 DIAGNOSIS — N52.9 ERECTILE DYSFUNCTION, UNSPECIFIED ERECTILE DYSFUNCTION TYPE: ICD-10-CM

## 2022-04-06 RX ORDER — SILDENAFIL CITRATE 20 MG/1
TABLET ORAL
Qty: 25 TABLET | Refills: 2 | Status: SHIPPED | OUTPATIENT
Start: 2022-04-06

## 2022-04-06 NOTE — TELEPHONE ENCOUNTER
----- Message from Kelyb Seo MD sent at 4/6/2022  1:06 PM EDT -----  Regarding: FW: Refill  Send order(s) to me for authorization.      ----- Message -----  From: Tameka Barker MA  Sent: 4/6/2022   8:08 AM EDT  To: Kelby eSo MD  Subject: FW: Refill                                         ----- Message -----  From: Jackson William  Sent: 4/5/2022   9:21 PM EDT  To: [facility] [address]  Subject: Refill                                           Hello Dr. Seo, hope all is going well with you. I was wondering if it would be possible to get a refill for the Sildenafil that you have prescribed before. I actually have a few still in the bottle from the last time but the bottle says to discard after 6/17/21. If you feel they may still be good to use I will, but if not could you refill it?  I actually had this one filled at [facility]. [address]  Thank You and will see you at my next appointment.

## 2022-06-15 ENCOUNTER — OFFICE VISIT (OUTPATIENT)
Dept: INTERNAL MEDICINE | Age: 52
End: 2022-06-15

## 2022-06-15 VITALS
BODY MASS INDEX: 34.4 KG/M2 | HEIGHT: 68 IN | SYSTOLIC BLOOD PRESSURE: 126 MMHG | DIASTOLIC BLOOD PRESSURE: 78 MMHG | OXYGEN SATURATION: 98 % | HEART RATE: 84 BPM | WEIGHT: 227 LBS | TEMPERATURE: 98.2 F

## 2022-06-15 DIAGNOSIS — R73.09 ELEVATED HEMOGLOBIN A1C: ICD-10-CM

## 2022-06-15 DIAGNOSIS — E78.2 MIXED HYPERLIPIDEMIA: ICD-10-CM

## 2022-06-15 DIAGNOSIS — E66.9 OBESITY (BMI 30-39.9): Chronic | ICD-10-CM

## 2022-06-15 DIAGNOSIS — I10 PRIMARY HYPERTENSION: Primary | ICD-10-CM

## 2022-06-15 PROBLEM — R07.89 ANTERIOR CHEST WALL PAIN: Status: RESOLVED | Noted: 2021-12-03 | Resolved: 2022-06-15

## 2022-06-15 PROCEDURE — 99214 OFFICE O/P EST MOD 30 MIN: CPT | Performed by: INTERNAL MEDICINE

## 2022-06-15 NOTE — PROGRESS NOTES
I N T E R N A L  M E D I C I N E  J U N O H  K I M,  M D      ENCOUNTER DATE:  06/15/2022    Jackson DOTSON Stewart / 51 y.o. / male      CHIEF COMPLAINT / REASON FOR OFFICE VISIT     Hypertension, Hyperlipidemia, Suspected sleep apnea , and Obesity      ASSESSMENT & PLAN     Problem List Items Addressed This Visit        High    Hypertension - Primary (Chronic)    Overview     Continue candesartan 16 mg and amlodipine 10 mg qd.            Relevant Medications    candesartan (ATACAND) 16 MG tablet    amLODIPine (NORVASC) 10 MG tablet    Other Relevant Orders    Comprehensive Metabolic Panel (Completed)       Medium    Hyperlipidemia (Chronic)    Overview     Continue rosuvastatin 10 mg qd.            Relevant Medications    rosuvastatin (CRESTOR) 10 MG tablet    Other Relevant Orders    Comprehensive Metabolic Panel (Completed)    Lipid Panel With / Chol / HDL Ratio (Completed)       Low    Elevated hemoglobin A1c (Chronic)    Overview     Recommended ongoing weight loss efforts.  Maintain a low sugar/starch/carbohydrate diet.     Lab Results   Component Value Date    HGBA1C 5.6 06/15/2022    HGBA1C 5.7 (H) 12/03/2021    HGBA1C 5.20 07/20/2020               Relevant Orders    Hemoglobin A1c (Completed)    Obesity (BMI 30-39.9) (Chronic)    Current Assessment & Plan     Maintain low carbohydrate diet and increase physical activity.  Wt Readings from Last 3 Encounters:   06/15/22 103 kg (227 lb)   12/03/21 103 kg (227 lb)   06/02/21 98 kg (216 lb)     Body mass index is 34.53 kg/m².                    Orders Placed This Encounter   Procedures   • Comprehensive Metabolic Panel   • Hemoglobin A1c   • Lipid Panel With / Chol / HDL Ratio     No orders of the defined types were placed in this encounter.      SUMMARY/DISCUSSION  •       Next Appointment with me: Visit date not found    Return in about 6 months (around 12/15/2022) for Hypertension, Hyperlipidemia, Obesity.      VITAL SIGNS     Vitals:    06/15/22 0745   BP:  "126/78   BP Location: Left arm   Pulse: 84   Temp: 98.2 °F (36.8 °C)   SpO2: 98%   Weight: 103 kg (227 lb)   Height: 172.7 cm (67.99\")       BP Readings from Last 3 Encounters:   06/15/22 126/78   12/03/21 130/80   06/02/21 140/78     Wt Readings from Last 3 Encounters:   06/15/22 103 kg (227 lb)   12/03/21 103 kg (227 lb)   06/02/21 98 kg (216 lb)     Body mass index is 34.53 kg/m².    Blood pressure readings recorded on patient flowsheet:  No flowsheet data found.       MEDICATIONS AT THE TIME OF OFFICE VISIT     Current Outpatient Medications on File Prior to Visit   Medication Sig   • amLODIPine (NORVASC) 10 MG tablet TAKE 1 TABLET BY MOUTH DAILY   • candesartan (ATACAND) 16 MG tablet TAKE 1 TABLET BY MOUTH DAILY   • fexofenadine (ALLEGRA ALLERGY) 180 MG tablet Take 1 tablet by mouth Daily.   • fluticasone (FLONASE) 50 MCG/ACT nasal spray 2 sprays into the nostril(s) as directed by provider Daily. Administer 2 sprays in each nostril for each dose.   • Multiple Vitamin (MULTI-VITAMIN) tablet Take 1 tablet by mouth daily.   • nystatin (MYCOSTATIN) 780184 UNIT/GM powder Apply  topically to the appropriate area as directed 2 (Two) Times a Day.   • pantoprazole (PROTONIX) 40 MG EC tablet Take 1 tablet by mouth Daily.   • rosuvastatin (CRESTOR) 10 MG tablet TAKE 1 TABLET BY MOUTH EVERY NIGHT   • sildenafil (REVATIO) 20 MG tablet Take 3-5 tablets prior to sexual activity.     No current facility-administered medications on file prior to visit.          HISTORY OF PRESENT ILLNESS     Hypertension and hyperlipidemia remained stable on medications.  His weight remains about the same with BMI of 34.53 with elevated A1c.  Most recent A1c level was 5.7.      Patient Care Team:  Kelby Seo MD as PCP - General (Internal Medicine)    REVIEW OF SYSTEMS     Review of Systems   Constitutional neg except per HPI   Resp neg  CV neg     PHYSICAL EXAMINATION     Physical Exam  General: No acute distress, obese  Psych: Normal thought " and judgment   Cardiovascular Rate: normal. Rhythm: regular. Heart sounds: normal.       REVIEWED DATA     Labs:     Lab Results   Component Value Date     06/15/2022    K 4.2 06/15/2022    CALCIUM 9.3 06/15/2022    AST 27 06/15/2022    ALT 36 06/15/2022    BUN 21 06/15/2022    CREATININE 1.02 06/15/2022    CREATININE 0.96 12/03/2021    CREATININE 0.87 01/29/2021    EGFRIFNONA 92 12/03/2021    EGFRIFAFRI 106 12/03/2021       Lab Results   Component Value Date    HGBA1C 5.6 06/15/2022    HGBA1C 5.7 (H) 12/03/2021    HGBA1C 5.20 07/20/2020       Lab Results   Component Value Date    LDL 75 06/15/2022    LDL 91 06/07/2021    LDL 67 07/20/2020    HDL 45 06/15/2022    HDL 53 06/07/2021    TRIG 147 06/15/2022    TRIG 125 06/07/2021       Lab Results   Component Value Date    TSH 2.300 01/29/2021    TSH 4.250 (H) 07/20/2020    TSH 2.940 10/08/2018    FREET4 1.07 01/29/2021    FREET4 1.03 07/20/2020    FREET4 1.15 10/08/2018       Lab Results   Component Value Date    WBC 5.89 07/20/2020    HGB 14.5 07/20/2020     07/20/2020       No results found for: MALBCRERATIO       Imaging:           Medical Tests:           Summary of old records / correspondence / consultant report:           Request outside records:             *Examiner was wearing KN95 mask and eye protection during the entire duration of the visit. Patient was masked the entire time. Minimum social distance of 6 ft maintained entire visit except if physical contact was necessary as documented.       Template created by Vipul Seo MD

## 2022-06-16 LAB
ALBUMIN SERPL-MCNC: 4.8 G/DL (ref 4–5)
ALBUMIN/GLOB SERPL: 2 {RATIO} (ref 1.2–2.2)
ALP SERPL-CCNC: 54 IU/L (ref 44–121)
ALT SERPL-CCNC: 36 IU/L (ref 0–44)
AST SERPL-CCNC: 27 IU/L (ref 0–40)
BILIRUB SERPL-MCNC: 0.4 MG/DL (ref 0–1.2)
BUN SERPL-MCNC: 21 MG/DL (ref 6–24)
BUN/CREAT SERPL: 21 (ref 9–20)
CALCIUM SERPL-MCNC: 9.3 MG/DL (ref 8.7–10.2)
CHLORIDE SERPL-SCNC: 104 MMOL/L (ref 96–106)
CHOLEST SERPL-MCNC: 146 MG/DL (ref 100–199)
CHOLEST/HDLC SERPL: 3.2 RATIO (ref 0–5)
CO2 SERPL-SCNC: 23 MMOL/L (ref 20–29)
CREAT SERPL-MCNC: 1.02 MG/DL (ref 0.76–1.27)
EGFRCR SERPLBLD CKD-EPI 2021: 90 ML/MIN/1.73
GLOBULIN SER CALC-MCNC: 2.4 G/DL (ref 1.5–4.5)
GLUCOSE SERPL-MCNC: 99 MG/DL (ref 65–99)
HBA1C MFR BLD: 5.6 % (ref 4.8–5.6)
HDLC SERPL-MCNC: 45 MG/DL
LDLC SERPL CALC-MCNC: 75 MG/DL (ref 0–99)
POTASSIUM SERPL-SCNC: 4.2 MMOL/L (ref 3.5–5.2)
PROT SERPL-MCNC: 7.2 G/DL (ref 6–8.5)
SODIUM SERPL-SCNC: 140 MMOL/L (ref 134–144)
TRIGL SERPL-MCNC: 147 MG/DL (ref 0–149)
VLDLC SERPL CALC-MCNC: 26 MG/DL (ref 5–40)

## 2022-06-16 NOTE — ASSESSMENT & PLAN NOTE
Maintain low carbohydrate diet and increase physical activity.  Wt Readings from Last 3 Encounters:   06/15/22 103 kg (227 lb)   12/03/21 103 kg (227 lb)   06/02/21 98 kg (216 lb)     Body mass index is 34.53 kg/m².

## 2022-10-08 DIAGNOSIS — I10 ESSENTIAL HYPERTENSION: Chronic | ICD-10-CM

## 2022-10-10 RX ORDER — CANDESARTAN 16 MG/1
16 TABLET ORAL DAILY
Qty: 90 TABLET | Refills: 3 | Status: SHIPPED | OUTPATIENT
Start: 2022-10-10

## 2022-12-21 ENCOUNTER — OFFICE VISIT (OUTPATIENT)
Dept: INTERNAL MEDICINE | Age: 52
End: 2022-12-21

## 2022-12-21 VITALS
BODY MASS INDEX: 34.56 KG/M2 | HEART RATE: 86 BPM | OXYGEN SATURATION: 98 % | DIASTOLIC BLOOD PRESSURE: 90 MMHG | SYSTOLIC BLOOD PRESSURE: 150 MMHG | WEIGHT: 228 LBS | HEIGHT: 68 IN | TEMPERATURE: 97.9 F

## 2022-12-21 DIAGNOSIS — I10 PRIMARY HYPERTENSION: Primary | Chronic | ICD-10-CM

## 2022-12-21 DIAGNOSIS — K64.9 HEMORRHOIDS, UNSPECIFIED HEMORRHOID TYPE: ICD-10-CM

## 2022-12-21 DIAGNOSIS — Z12.5 ENCOUNTER FOR SCREENING FOR MALIGNANT NEOPLASM OF PROSTATE: ICD-10-CM

## 2022-12-21 DIAGNOSIS — E78.2 MIXED HYPERLIPIDEMIA: Chronic | ICD-10-CM

## 2022-12-21 DIAGNOSIS — E66.9 OBESITY (BMI 30-39.9): Chronic | ICD-10-CM

## 2022-12-21 DIAGNOSIS — Z00.00 ENCOUNTER FOR ANNUAL HEALTH EXAMINATION: ICD-10-CM

## 2022-12-21 DIAGNOSIS — R73.09 ELEVATED HEMOGLOBIN A1C: Chronic | ICD-10-CM

## 2022-12-21 PROCEDURE — 99214 OFFICE O/P EST MOD 30 MIN: CPT | Performed by: INTERNAL MEDICINE

## 2022-12-21 NOTE — ASSESSMENT & PLAN NOTE
Blood pressure is elevated today.  Send blood pressure readings from home in 1 month.  Continue same meds for now.  Maintain low-sodium diet and encouraged weight loss.    BP Readings from Last 3 Encounters:   12/21/22 150/90   06/15/22 126/78   12/03/21 130/80

## 2022-12-21 NOTE — PROGRESS NOTES
I N T E R N A L  M E D I C I N E    J U N O H  K I M,  M D      ENCOUNTER DATE:  12/21/2022    Jackson DOTSON Stewart / 52 y.o. / male    CHIEF COMPLAINT / REASON FOR OFFICE VISIT     Hypertension, Hyperlipidemia, and Obesity      ASSESSMENT & PLAN     Problem List Items Addressed This Visit        High    Hypertension - Primary (Chronic)    Overview     Continue candesartan 16 mg and amlodipine 10 mg qd.          Current Assessment & Plan     Blood pressure is elevated today.  Send blood pressure readings from home in 1 month.  Continue same meds for now.  Maintain low-sodium diet and encouraged weight loss.    BP Readings from Last 3 Encounters:   12/21/22 150/90   06/15/22 126/78   12/03/21 130/80             Relevant Medications    amLODIPine (NORVASC) 10 MG tablet    candesartan (ATACAND) 16 MG tablet       Medium    Hyperlipidemia (Chronic)    Overview     Continue rosuvastatin 10 mg qd.          Relevant Medications    rosuvastatin (CRESTOR) 10 MG tablet       Low    Elevated hemoglobin A1c (Chronic)    Overview     Recommended ongoing weight loss efforts.  Maintain a low sugar/starch/carbohydrate diet.     Lab Results   Component Value Date    HGBA1C 5.6 06/15/2022    HGBA1C 5.7 (H) 12/03/2021    HGBA1C 5.20 07/20/2020             Obesity (BMI 30-39.9) (Chronic)    Hemorrhoids (Chronic)    Current Assessment & Plan     Will place referral to colorectal for evaluation         Relevant Orders    Ambulatory Referral to Colorectal Surgery   Other Visit Diagnoses     Encounter for annual health examination        Relevant Orders    CBC & Differential    Comprehensive Metabolic Panel    Hemoglobin A1c    Lipid Panel With / Chol / HDL Ratio    PSA Screen    TSH+Free T4    Urinalysis With Microscopic If Indicated (No Culture) - Urine, Clean Catch    Encounter for screening for malignant neoplasm of prostate        Relevant Orders    PSA Screen        Orders Placed This Encounter   Procedures   • Comprehensive Metabolic  "Panel   • Hemoglobin A1c   • Lipid Panel With / Chol / HDL Ratio   • PSA Screen   • TSH+Free T4   • Urinalysis With Microscopic If Indicated (No Culture) - Urine, Clean Catch   • Ambulatory Referral to Colorectal Surgery   • CBC & Differential     No orders of the defined types were placed in this encounter.      SUMMARY/DISCUSSION  •       Next Appointment with me: Visit date not found    Return in about 4 months (around 4/21/2023) for Reassess chronic medical problems.      VITAL SIGNS     Vitals:    12/21/22 0750   BP: 150/90   Pulse: 86   Temp: 97.9 °F (36.6 °C)   SpO2: 98%   Weight: 103 kg (228 lb)   Height: 172.7 cm (67.99\")       BP Readings from Last 3 Encounters:   12/21/22 150/90   06/15/22 126/78   12/03/21 130/80     Wt Readings from Last 3 Encounters:   12/21/22 103 kg (228 lb)   06/15/22 103 kg (227 lb)   12/03/21 103 kg (227 lb)     Body mass index is 34.68 kg/m².    Blood pressure readings recorded on patient flowsheet:  No flowsheet data found.       MEDICATIONS AT THE TIME OF OFFICE VISIT     Current Outpatient Medications on File Prior to Visit   Medication Sig   • amLODIPine (NORVASC) 10 MG tablet TAKE 1 TABLET BY MOUTH DAILY   • candesartan (ATACAND) 16 MG tablet TAKE 1 TABLET BY MOUTH DAILY   • fexofenadine (ALLEGRA ALLERGY) 180 MG tablet Take 1 tablet by mouth Daily.   • fluticasone (FLONASE) 50 MCG/ACT nasal spray 2 sprays into the nostril(s) as directed by provider Daily. Administer 2 sprays in each nostril for each dose.   • Multiple Vitamin (MULTI-VITAMIN) tablet Take 1 tablet by mouth daily.   • nystatin (MYCOSTATIN) 528388 UNIT/GM powder Apply  topically to the appropriate area as directed 2 (Two) Times a Day.   • pantoprazole (PROTONIX) 40 MG EC tablet Take 1 tablet by mouth Daily.   • rosuvastatin (CRESTOR) 10 MG tablet TAKE 1 TABLET BY MOUTH EVERY NIGHT   • sildenafil (REVATIO) 20 MG tablet Take 3-5 tablets prior to sexual activity.     No current facility-administered medications on " file prior to visit.          HISTORY OF PRESENT ILLNESS     He has not been monitoring his blood pressure as closely.  He does report compliance on candesartan and amlodipine for hypertension.  Denies headaches chest pains or excessive fatigue/lightheadedness.  Takes rosuvastatin 10 mg for hyperlipidemia without significant problems.  Previous A1c level was improved at 5.6.  No significant change in his weight.  He does complain of ongoing symptoms of hemorrhoid with seepage perianal pruritus.  He has tried various things for hemorrhoids including hemorrhoidal cream.  He has been using moistened toilet paper after bowel movements.  He does note some moisture due to seepage.  His last colonoscopy was on April 2019 which was entirely normal.      Patient Care Team:  Kelby Seo MD as PCP - General (Internal Medicine)    REVIEW OF SYSTEMS     Review of Systems       PHYSICAL EXAMINATION     Physical Exam  General: No acute distress  Psych: Normal thought and judgment   Cardiovascular Rate: normal. Rhythm: regular. Heart sounds: normal.   Pulm/Chest: Effort normal, breath sounds normal.       REVIEWED DATA     Labs:     Lab Results   Component Value Date     06/15/2022    K 4.2 06/15/2022    CALCIUM 9.3 06/15/2022    AST 27 06/15/2022    ALT 36 06/15/2022    BUN 21 06/15/2022    CREATININE 1.02 06/15/2022    CREATININE 0.96 12/03/2021    CREATININE 0.87 01/29/2021    EGFRIFNONA 92 12/03/2021    EGFRIFAFRI 106 12/03/2021       Lab Results   Component Value Date    HGBA1C 5.6 06/15/2022    HGBA1C 5.7 (H) 12/03/2021    HGBA1C 5.20 07/20/2020       Lab Results   Component Value Date    LDL 75 06/15/2022    LDL 91 06/07/2021    LDL 67 07/20/2020    HDL 45 06/15/2022    HDL 53 06/07/2021    TRIG 147 06/15/2022    TRIG 125 06/07/2021       Lab Results   Component Value Date    TSH 2.300 01/29/2021    TSH 4.250 (H) 07/20/2020    TSH 2.940 10/08/2018    FREET4 1.07 01/29/2021    FREET4 1.03 07/20/2020    FREET4 1.15  10/08/2018       Lab Results   Component Value Date    WBC 5.89 07/20/2020    HGB 14.5 07/20/2020     07/20/2020       No results found for: MALBCRERATIO       Imaging:           Medical Tests:     Colonoscopy 4/8/2019: Normal exam      Summary of old records / correspondence / consultant report:           Request outside records:             *Examiner was wearing KN95 mask and eye protection during the entire duration of the visit. Patient was masked the entire time. Minimum social distance of 6 ft maintained entire visit except if physical contact was necessary as documented.       Template created by Vipul Seo MD

## 2023-01-24 ENCOUNTER — OFFICE VISIT (OUTPATIENT)
Dept: SURGERY | Facility: CLINIC | Age: 53
End: 2023-01-24
Payer: COMMERCIAL

## 2023-01-24 VITALS
HEIGHT: 68 IN | BODY MASS INDEX: 34.69 KG/M2 | OXYGEN SATURATION: 97 % | HEART RATE: 70 BPM | DIASTOLIC BLOOD PRESSURE: 86 MMHG | SYSTOLIC BLOOD PRESSURE: 144 MMHG | WEIGHT: 228.9 LBS

## 2023-01-24 DIAGNOSIS — K62.9 DISORDER OF ANUS: Primary | ICD-10-CM

## 2023-01-24 PROCEDURE — 99204 OFFICE O/P NEW MOD 45 MIN: CPT | Performed by: PHYSICIAN ASSISTANT

## 2023-01-24 RX ORDER — SODIUM CHLORIDE, SODIUM LACTATE, POTASSIUM CHLORIDE, CALCIUM CHLORIDE 600; 310; 30; 20 MG/100ML; MG/100ML; MG/100ML; MG/100ML
30 INJECTION, SOLUTION INTRAVENOUS CONTINUOUS
Status: CANCELLED | OUTPATIENT
Start: 2023-04-17
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
## 2023-01-27 ENCOUNTER — PATIENT ROUNDING (BHMG ONLY) (OUTPATIENT)
Dept: SURGERY | Facility: CLINIC | Age: 53
End: 2023-01-27
Payer: COMMERCIAL

## 2023-01-27 NOTE — PROGRESS NOTES
January 27, 2023    Hello, may I speak with Jackson William?    My name is Adam      I am  with [facility]  [address]  [place] [facility] [place] [address] [phone].    Before we get started may I verify your date of birth? 1970    I am calling to officially welcome you to our practice and ask about your recent visit. Is this a good time to talk? yes    Tell me about your visit with us. What things went well?  everything went well        We're always looking for ways to make our patients' experiences even better. Do you have recommendations on ways we may improve?  no    Overall were you satisfied with your first visit to our practice? yes       I appreciate you taking the time to speak with me today. Is there anything else I can do for you? no      Thank you, and have a great day.

## 2023-02-10 DIAGNOSIS — I10 ESSENTIAL HYPERTENSION: ICD-10-CM

## 2023-02-10 DIAGNOSIS — E78.2 MIXED HYPERLIPIDEMIA: Chronic | ICD-10-CM

## 2023-02-13 RX ORDER — AMLODIPINE BESYLATE 10 MG/1
TABLET ORAL
Qty: 30 TABLET | Refills: 11 | Status: SHIPPED | OUTPATIENT
Start: 2023-02-13

## 2023-02-13 RX ORDER — ROSUVASTATIN CALCIUM 10 MG/1
10 TABLET, COATED ORAL NIGHTLY
Qty: 30 TABLET | Refills: 11 | Status: SHIPPED | OUTPATIENT
Start: 2023-02-13

## 2023-04-18 ENCOUNTER — OFFICE VISIT (OUTPATIENT)
Dept: INTERNAL MEDICINE | Age: 53
End: 2023-04-18
Payer: COMMERCIAL

## 2023-04-18 VITALS
HEART RATE: 107 BPM | WEIGHT: 211 LBS | SYSTOLIC BLOOD PRESSURE: 110 MMHG | HEIGHT: 68 IN | TEMPERATURE: 97.3 F | BODY MASS INDEX: 31.98 KG/M2 | DIASTOLIC BLOOD PRESSURE: 70 MMHG | OXYGEN SATURATION: 98 %

## 2023-04-18 DIAGNOSIS — E78.2 MIXED HYPERLIPIDEMIA: Chronic | ICD-10-CM

## 2023-04-18 DIAGNOSIS — R73.09 ELEVATED HEMOGLOBIN A1C: Chronic | ICD-10-CM

## 2023-04-18 DIAGNOSIS — S82.872S CLOSED DISPLACED PILON FRACTURE OF LEFT TIBIA, SEQUELA: ICD-10-CM

## 2023-04-18 DIAGNOSIS — I10 PRIMARY HYPERTENSION: Primary | Chronic | ICD-10-CM

## 2023-04-18 PROBLEM — S82.872A CLOSED DISPLACED PILON FRACTURE OF LEFT TIBIA: Status: ACTIVE | Noted: 2023-04-18

## 2023-04-18 RX ORDER — SULFAMETHOXAZOLE AND TRIMETHOPRIM 800; 160 MG/1; MG/1
1 TABLET ORAL EVERY 12 HOURS SCHEDULED
COMMUNITY
Start: 2023-04-11

## 2023-04-18 RX ORDER — HYDROCODONE BITARTRATE AND ACETAMINOPHEN 5; 325 MG/1; MG/1
1 TABLET ORAL EVERY 6 HOURS PRN
COMMUNITY
Start: 2023-04-12

## 2023-04-18 RX ORDER — CALCIUM CARBONATE 500(1250)
1 TABLET ORAL EVERY 12 HOURS SCHEDULED
COMMUNITY
Start: 2023-03-24

## 2023-04-18 NOTE — PROGRESS NOTES
"    I N T E R N A L  M E D I C I N E    J U N O H  K I M,  M D      ENCOUNTER DATE:  04/18/2023    Jackson DOTSON Stewart / 52 y.o. / male    CHIEF COMPLAINT / REASON FOR OFFICE VISIT     Hypertension, Hyperlipidemia, and Obesity      ASSESSMENT & PLAN     Problem List Items Addressed This Visit        High    Hypertension - Primary (Chronic)    Overview     Continue candesartan 16 mg and amlodipine 10 mg qd.          Relevant Medications    candesartan (ATACAND) 16 MG tablet    amLODIPine (NORVASC) 10 MG tablet       Medium    Hyperlipidemia (Chronic)    Overview     Continue rosuvastatin 10 mg qd.          Relevant Medications    rosuvastatin (CRESTOR) 10 MG tablet    Elevated hemoglobin A1c (Chronic)    Overview     Recommended ongoing weight loss efforts.  Maintain a low sugar/starch/carbohydrate diet.     Lab Results   Component Value Date    HGBA1C 5.6 06/15/2022    HGBA1C 5.7 (H) 12/03/2021    HGBA1C 5.20 07/20/2020                Low    Closed displaced pilon fracture of left tibia    Current Assessment & Plan     Fell from ladder at work. Off work for awhile. Follow-up with orthopedist.           No orders of the defined types were placed in this encounter.    No orders of the defined types were placed in this encounter.      SUMMARY/DISCUSSION  •       Next Appointment with me: 7/25/2023    Return for Next scheduled follow up.      VITAL SIGNS     Vitals:    04/18/23 0927   BP: 110/70   Pulse: 107   Temp: 97.3 °F (36.3 °C)   SpO2: 98%   Weight: 95.7 kg (211 lb)   Height: 172.7 cm (68\")       BP Readings from Last 3 Encounters:   04/18/23 110/70   01/24/23 144/86   12/21/22 150/90     Wt Readings from Last 3 Encounters:   04/18/23 95.7 kg (211 lb)   01/24/23 104 kg (228 lb 14.4 oz)   12/21/22 103 kg (228 lb)     Body mass index is 32.08 kg/m².    Blood pressure readings recorded on patient flowsheet:       View : No data to display.                   MEDICATIONS AT THE TIME OF OFFICE VISIT     Current Outpatient " Medications on File Prior to Visit   Medication Sig   • amLODIPine (NORVASC) 10 MG tablet TAKE 1 TABLET BY MOUTH DAILY   • calcium carbonate, oyster shell, 500 MG tablet tablet Take 1 tablet by mouth Every 12 (Twelve) Hours.   • candesartan (ATACAND) 16 MG tablet TAKE 1 TABLET BY MOUTH DAILY   • fluticasone (FLONASE) 50 MCG/ACT nasal spray 2 sprays into the nostril(s) as directed by provider Daily. Administer 2 sprays in each nostril for each dose.   • HYDROcodone-acetaminophen (NORCO) 5-325 MG per tablet Take 1 tablet by mouth Every 6 (Six) Hours As Needed. for pain   • Multiple Vitamin (MULTI-VITAMIN) tablet Take 1 tablet by mouth Daily.   • rosuvastatin (CRESTOR) 10 MG tablet TAKE 1 TABLET BY MOUTH EVERY NIGHT   • sildenafil (REVATIO) 20 MG tablet Take 3-5 tablets prior to sexual activity.   • sulfamethoxazole-trimethoprim (BACTRIM DS,SEPTRA DS) 800-160 MG per tablet Take 1 tablet by mouth Every 12 (Twelve) Hours.   • vitamin D3 125 MCG (5000 UT) capsule capsule Take 1 capsule by mouth Daily.   • fexofenadine (ALLEGRA ALLERGY) 180 MG tablet Take 1 tablet by mouth Daily. (Patient not taking: Reported on 4/18/2023)   • nystatin (MYCOSTATIN) 237370 UNIT/GM powder Apply  topically to the appropriate area as directed 2 (Two) Times a Day. (Patient not taking: Reported on 4/18/2023)     No current facility-administered medications on file prior to visit.          HISTORY OF PRESENT ILLNESS     Patient fell from a ladder at work and fractured his left tibia and underwent external fixation.  He will be off work for a while and is being followed by orthopedic surgery.  He denies unusual swelling of the leg, paresthesia or worsening pain.  Denies any chest pain or shortness of breath.    Patient reports compliance with his blood pressure medications including candesartan and amlodipine and blood pressure has improved since the last visit.  He has been working on weight loss and has lost close to 17 pounds.  Previously he  was noted to have elevated A1c and he is on rosuvastatin 10 mg for cholesterol.      Patient Care Team:  Kelby Seo MD as PCP - General (Internal Medicine)  Shraddha Parsons PA-C as Physician Assistant (Colon and Rectal Surgery)    REVIEW OF SYSTEMS     Review of Systems   Intended wt loss   Constitutional neg except per HPI   Resp neg  CV neg     PHYSICAL EXAMINATION     Physical Exam  General: No acute distress  Psych: Normal thought and judgment   Cardiovascular Rate: normal. Rhythm: regular. Heart sounds: normal   Pulm/Chest: Effort normal, breath sounds normal.  Left lower leg in cast ; toes with intact cap refill and sensation to light touch       REVIEWED DATA     Labs:     Lab Results   Component Value Date     06/15/2022    K 4.2 06/15/2022    CALCIUM 9.3 06/15/2022    AST 27 06/15/2022    ALT 36 06/15/2022    BUN 21 06/15/2022    CREATININE 1.02 06/15/2022    CREATININE 0.96 12/03/2021    CREATININE 0.87 01/29/2021    EGFRIFNONA 92 12/03/2021    EGFRIFAFRI 106 12/03/2021       Lab Results   Component Value Date    HGBA1C 5.6 06/15/2022    HGBA1C 5.7 (H) 12/03/2021    HGBA1C 5.20 07/20/2020       Lab Results   Component Value Date    LDL 75 06/15/2022    LDL 91 06/07/2021    LDL 67 07/20/2020    HDL 45 06/15/2022    HDL 53 06/07/2021    TRIG 147 06/15/2022    TRIG 125 06/07/2021       Lab Results   Component Value Date    TSH 2.300 01/29/2021    TSH 4.250 (H) 07/20/2020    TSH 2.940 10/08/2018    FREET4 1.07 01/29/2021    FREET4 1.03 07/20/2020    FREET4 1.15 10/08/2018       Lab Results   Component Value Date    WBC 5.89 07/20/2020    HGB 14.5 07/20/2020     07/20/2020       No results found for: MALBCRERATIO         Imaging:           Medical Tests:           Summary of old records / correspondence / consultant report:           Request outside records:             *Examiner was wearing KN95 mask during the entire duration of the visit. Patient was masked the entire time. Minimum social  distance of 6 ft maintained entire visit except if physical contact was necessary as documented.       Template created by Vipul Seo MD

## 2023-07-25 ENCOUNTER — OFFICE VISIT (OUTPATIENT)
Dept: INTERNAL MEDICINE | Age: 53
End: 2023-07-25
Payer: COMMERCIAL

## 2023-07-25 VITALS
HEART RATE: 89 BPM | BODY MASS INDEX: 33.34 KG/M2 | OXYGEN SATURATION: 98 % | HEIGHT: 68 IN | DIASTOLIC BLOOD PRESSURE: 80 MMHG | TEMPERATURE: 97.5 F | SYSTOLIC BLOOD PRESSURE: 118 MMHG | WEIGHT: 220 LBS

## 2023-07-25 DIAGNOSIS — Z12.5 ENCOUNTER FOR SCREENING FOR MALIGNANT NEOPLASM OF PROSTATE: ICD-10-CM

## 2023-07-25 DIAGNOSIS — Z00.00 ENCOUNTER FOR ANNUAL HEALTH EXAMINATION: Primary | ICD-10-CM

## 2023-07-25 PROBLEM — E66.09 CLASS 1 OBESITY DUE TO EXCESS CALORIES WITH SERIOUS COMORBIDITY AND BODY MASS INDEX (BMI) OF 33.0 TO 33.9 IN ADULT: Chronic | Status: ACTIVE | Noted: 2017-10-12

## 2023-07-25 PROBLEM — E66.811 CLASS 1 OBESITY DUE TO EXCESS CALORIES WITH SERIOUS COMORBIDITY AND BODY MASS INDEX (BMI) OF 33.0 TO 33.9 IN ADULT: Chronic | Status: ACTIVE | Noted: 2017-10-12

## 2023-07-25 PROCEDURE — 90471 IMMUNIZATION ADMIN: CPT | Performed by: INTERNAL MEDICINE

## 2023-07-25 PROCEDURE — 99396 PREV VISIT EST AGE 40-64: CPT | Performed by: INTERNAL MEDICINE

## 2023-07-25 PROCEDURE — 90714 TD VACC NO PRESV 7 YRS+ IM: CPT | Performed by: INTERNAL MEDICINE

## 2023-07-25 NOTE — PROGRESS NOTES
"    I N T E R N A L  M E D I C I N E    J U N O H  K I M,  M D      ENCOUNTER DATE:  07/25/2023    Jackson D Stewart / 52 y.o. / male    CHIEF COMPLAINT     Annual Exam (7/29/20)      VITALS     Vitals:    07/25/23 0728   BP: 118/80   Pulse: 89   Temp: 97.5 °F (36.4 °C)   SpO2: 98%   Weight: 99.8 kg (220 lb)   Height: 172.7 cm (68\")       BP Readings from Last 3 Encounters:   07/25/23 118/80   07/17/23 140/92   04/18/23 110/70     Wt Readings from Last 3 Encounters:   07/25/23 99.8 kg (220 lb)   07/17/23 100 kg (221 lb)   04/18/23 95.7 kg (211 lb)      Body mass index is 33.45 kg/m².    Blood pressure readings recorded on patient flowsheet:       No data to display                MEDICATIONS     Current Outpatient Medications on File Prior to Visit   Medication Sig Dispense Refill    amLODIPine (NORVASC) 10 MG tablet TAKE 1 TABLET BY MOUTH DAILY 30 tablet 11    calcium carbonate, oyster shell, 500 MG tablet tablet Take 1 tablet by mouth Every 12 (Twelve) Hours.      candesartan (ATACAND) 16 MG tablet TAKE 1 TABLET BY MOUTH DAILY 90 tablet 3    fexofenadine (ALLEGRA ALLERGY) 180 MG tablet Take 1 tablet by mouth Daily.      fluticasone (FLONASE) 50 MCG/ACT nasal spray 2 sprays into the nostril(s) as directed by provider Daily. Administer 2 sprays in each nostril for each dose.      Multiple Vitamin (MULTI-VITAMIN) tablet Take 1 tablet by mouth Daily.      rosuvastatin (CRESTOR) 10 MG tablet TAKE 1 TABLET BY MOUTH EVERY NIGHT 30 tablet 11    sildenafil (REVATIO) 20 MG tablet Take 3-5 tablets prior to sexual activity. 25 tablet 2    vitamin D3 125 MCG (5000 UT) capsule capsule Take 1 capsule by mouth Daily.      [DISCONTINUED] nystatin (MYCOSTATIN) 885777 UNIT/GM powder Apply  topically to the appropriate area as directed 2 (Two) Times a Day. (Patient not taking: Reported on 7/25/2023) 60 g 2     No current facility-administered medications on file prior to visit.         HISTORY OF PRESENT ILLNESS      Jackson presents for " annual health maintenance visit.  Fractured his left foot when he fell from 16 feet while working. Limited physical activity but weight has not changed much.     General health: multiple medical problems  Lifestyle:  Attempting to lose weight?: No   Diet: eats decently  Exercise: limited physical activity due to health/physical limitations  Tobacco: Remote history (short-term)   Alcohol: occasional/infrequent  Work: STD due to foot injury   Reproductive health:  Sexually active?: No   Concern for STD?: No   Sexual problems?: No problems   Sees Urologist?: No   Depression Screenin/25/2023     7:33 AM   PHQ-2/PHQ-9 Depression Screening   Little Interest or Pleasure in Doing Things 0-->not at all   Feeling Down, Depressed or Hopeless 0-->not at all   PHQ-9: Brief Depression Severity Measure Score 0         PHQ-2: 0 (Not depressed)     PHQ-9: 0 (Negative screening for depression)    Patient Care Team:  Kelby Seo MD as PCP - General (Internal Medicine)  Shraddha Parsons PA-C as Physician Assistant (Colon and Rectal Surgery)  ______________________________________________________________________    ALLERGIES  No Known Allergies     PFSH:     The following portions of the patient's history were reviewed and updated as appropriate: Allergies / Current Medications / Past Medical History / Surgical History / Social History / Family History    PROBLEM LIST   Patient Active Problem List   Diagnosis    Atopic rhinitis    Anemia    Arthralgia of multiple joints    Carpal tunnel syndrome    Hyperlipidemia    Hypertension    Elevated hemoglobin A1c    Suspected sleep apnea    Vitamin D deficiency    Class 1 obesity due to excess calories with serious comorbidity and body mass index (BMI) of 33.0 to 33.9 in adult    History of melanoma    Erectile dysfunction    Hemorrhoids    Disorder of anus    Closed displaced pilon fracture of left tibia       PAST MEDICAL HISTORY  Past Medical History:   Diagnosis Date     Allergic rhinitis     Anemia     Carpal tunnel syndrome     ED (erectile dysfunction)     Elevated hemoglobin A1c     Erectile dysfunction 07/22/2019    History of melanoma 07/22/2019    S/p resection (on upper back)(1990's)    Hyperlipidemia     Hypertension     Impaired fasting glucose     Malignant melanoma     Patellofemoral disorder of right knee     Reactive depression (situational)     Vitamin D deficiency        SURGICAL HISTORY  Past Surgical History:   Procedure Laterality Date    ANKLE SURGERY  03/22/2023    2nd 4/12/23    COLONOSCOPY N/A 04/08/2019    ANATOMICALLY NORMAL COLON, RESCOPE 5 YEARS. DR. LEONCIO ALCALA @ [facility].    COLONOSCOPY N/A 07/17/2023    Procedure: COLONOSCOPY to cecum with biopsy / polypectomy;  Surgeon: Cherry Orourke MD;  [facility] ENDOSCOPY;  Service: General;  Laterality: N/A;  pre-disorder of anus  post-polyp    DENTAL PROCEDURE  06/18/2020    sinus lift    SKIN CANCER EXCISION N/A 1990    Melanoma, back    WRIST SURGERY Right     TENDON REPAIR       SOCIAL HISTORY  Social History     Socioeconomic History    Marital status: Single    Number of children: 0   Tobacco Use    Smoking status: Former     Types: Cigars     Passive exposure: Past    Smokeless tobacco: Never   Vaping Use    Vaping Use: Never used    Passive vaping exposure: Yes   Substance and Sexual Activity    Alcohol use: Yes     Alcohol/week: 1.0 standard drink     Types: 1 Shots of liquor per week     Comment: OCCASIONAL     Drug use: No    Sexual activity: Not Currently       FAMILY HISTORY  Family History   Problem Relation Age of Onset    Breast cancer Mother     Hypertension Father     Colon polyps Father 84        precancerous    No Known Problems Sister     Hypertension Brother     Hypertension Brother     Lung cancer Maternal Grandfather         smoker    Heart failure Paternal Grandfather     Heart attack Paternal Uncle 80    Prostate cancer Neg Hx     Colon cancer Neg Hx     Diabetes Neg  Hx     Malig Hyperthermia Neg Hx        IMMUNIZATION HISTORY  Immunization History   Administered Date(s) Administered    Flu Vaccine Quad PF 6-35MO 10/12/2017    Flu Vaccine Quad PF >36MO 10/12/2017    FluMist 2-49yrs 10/12/2017    Fluzone Quad >6mos (Multi-dose) 12/14/2016    Hepatitis A 10/12/2018, 07/29/2020    Influenza Split Preservative Free ID 10/02/2013    Influenza, Unspecified 10/12/2018    Tdap 12/14/2016    flucelvax quad pfs =>4 YRS 10/12/2018         REVIEW OF SYSTEMS     Review of Systems   Constitutional: Negative.    HENT: Negative.     Eyes: Negative.    Respiratory: Negative.     Cardiovascular: Negative.    Gastrointestinal: Negative.    Endocrine: Negative.    Genitourinary: Negative.    Musculoskeletal: Negative.         Left foot fracture    Skin: Negative.    Allergic/Immunologic: Negative.    Neurological: Negative.    Hematological: Negative.    Psychiatric/Behavioral: Negative.         PHYSICAL EXAMINATION     Physical Exam  Constitutional:       Appearance: He is obese.   HENT:      Head: Normocephalic.      Right Ear: Tympanic membrane, ear canal and external ear normal.      Left Ear: Tympanic membrane, ear canal and external ear normal.      Mouth/Throat:      Mouth: Mucous membranes are moist.      Pharynx: Oropharynx is clear.   Eyes:      General: No scleral icterus.     Conjunctiva/sclera: Conjunctivae normal.      Pupils: Pupils are equal, round, and reactive to light.   Neck:      Thyroid: No thyromegaly.      Vascular: No carotid bruit.      Trachea: No tracheal deviation.   Cardiovascular:      Rate and Rhythm: Normal rate and regular rhythm.      Pulses: Normal pulses.      Heart sounds: Normal heart sounds. No murmur heard.    No friction rub. No gallop.      Comments: No carotid bruits  Pulmonary:      Effort: Pulmonary effort is normal.      Breath sounds: Normal breath sounds.   Abdominal:      General: There is no distension.      Palpations: Abdomen is soft. There is no  mass.      Tenderness: There is no abdominal tenderness.      Hernia: No hernia is present.   Musculoskeletal:      Cervical back: Neck supple.      Right lower leg: No edema.      Left lower leg: No edema.   Lymphadenopathy:      Cervical: No cervical adenopathy.      Upper Body:      Right upper body: No supraclavicular adenopathy.      Left upper body: No supraclavicular adenopathy.   Skin:     Coloration: Skin is not jaundiced or pale.      Findings: No erythema, lesion (Negative for suspicious skin lesions/growths) or rash.      Nails: There is no clubbing.      Comments: No suspicious skin lesions.    Neurological:      Mental Status: He is alert and oriented to person, place, and time.      Cranial Nerves: No cranial nerve deficit.      Motor: No abnormal muscle tone.   Psychiatric:         Mood and Affect: Mood normal.         Behavior: Behavior normal.         Thought Content: Thought content normal.         Judgment: Judgment normal.       REVIEWED DATA      Labs:    Lab Results   Component Value Date     07/18/2023    K 4.8 07/18/2023    CALCIUM 9.7 07/18/2023    AST 14 07/18/2023    ALT 27 07/18/2023    BUN 14 07/18/2023    CREATININE 0.97 07/18/2023    CREATININE 1.02 06/15/2022    CREATININE 0.96 12/03/2021    EGFRIFNONA 92 12/03/2021    EGFRIFAFRI 106 12/03/2021       Lab Results   Component Value Date    GLUCOSE 101 (H) 07/18/2023    HGBA1C 5.60 07/18/2023    HGBA1C 5.6 06/15/2022    HGBA1C 5.7 (H) 12/03/2021    TSH 2.940 07/18/2023    FREET4 1.29 07/18/2023       Lab Results   Component Value Date    PSA 0.570 07/18/2023    PSA 0.786 07/20/2020    PSA 0.644 10/08/2018       Lab Results   Component Value Date    TESTOSTEROTT 564 04/05/2017       Lab Results   Component Value Date    LDL 59 07/18/2023    HDL 47 07/18/2023    TRIG 108 07/18/2023    CHOLHDLRATIO 2.68 07/18/2023       No components found for: [id number]    Lab Results   Component Value Date    WBC 4.55 07/18/2023    HGB 14.5  07/18/2023    MCV 84.0 07/18/2023     07/18/2023       Lab Results   Component Value Date    PROTEIN See below: (A) 07/18/2023    GLUCOSEU Negative 07/18/2023    BLOODU Negative 07/18/2023    NITRITEU Negative 07/18/2023    LEUKOCYTESUR Negative 07/18/2023          Lab Results   Component Value Date    HEPCVIRUSABY <0.1 07/20/2020       Imaging:           Medical Tests:           ASSESSMENT & PLAN     ANNUAL WELLNESS EXAM / PHYSICAL     Other medical problems addressed today:  Problem List Items Addressed This Visit    None  Visit Diagnoses       Encounter for annual health examination    -  Primary    Relevant Orders    CBC & Differential    Comprehensive Metabolic Panel    Hemoglobin A1c    Lipid Panel With / Chol / HDL Ratio    PSA Screen    TSH+Free T4    Urinalysis With Microscopic If Indicated (No Culture) - Urine, Clean Catch    Encounter for screening for malignant neoplasm of prostate        Relevant Orders    PSA Screen            Summary/Discussion:         Next Appointment with me: Visit date not found    Return in about 6 months (around 1/25/2024) for Reassess chronic medical problems, ANNUAL PHYSICAL FOR NEXT YEAR.      HEALTHCARE MAINTENANCE ISSUES       Cancer Screening:  Colon: Initial/Next screening at age: CURRENT and -Colonoscopy  Repeat colon cancer screening: every 5 years  Prostate: Performed today and recommended annual screening  Testicular: Recommended monthly self exam  Skin: Monthly self skin examination, annual exam by health professional  Lung: Does not meet criteria for lung cancer screening.   Other:    Screening Labs & Tests:  Lab results reviewed & discussed with with patient or orders placed today.  EKG:  CV Screening: Lipid panel  DEXA (75+ or risk factors):   HEP C (If born [id number] or risk factors): Previously had negative screen  Other:     Immunization/Vaccinations (to be given today unless deferred by patient)  Influenza: Recommended annual influenza  vaccine  Hepatitis A: Up to date  Hepatitis B: Not needed at this time  Tetanus/Pertussis: Administer today  Pneumococcal: Not needed at this time  Shingles: Recommended Shingrix at pharmacy  COVID: Advised to take the vaccine   Lifestyle Counseling:  Lifestyle Modifications: Attempt to lose weight, Improve dietary compliance, Maintain a low sugar/carbohydrate diet, Follow a low fat, low cholesterol diet, Maintain low sodium diet (< 3 gm) for blood pressure, and Improve sleep hygiene  Safety Issues: Always wear seatbelt, Avoid texting while driving   Use sunscreen, regular skin examination  Recommended annual dental/vision examination.  Emotional/Stress/Sleep: Reviewed and  given when appropriate      Health Maintenance   Topic Date Due    COVID-19 Vaccine (1) 07/27/2023 (Originally 5/8/1971)    ZOSTER VACCINE (1 of 2) 12/31/2023 (Originally 11/8/2021)    INFLUENZA VACCINE  10/01/2023    PROSTATE CANCER SCREENING  07/18/2024    LIPID PANEL  07/18/2024    ANNUAL PHYSICAL  07/25/2024    TDAP/TD VACCINES (2 - Td or Tdap) 12/14/2026    COLORECTAL CANCER SCREENING  07/17/2028    HEPATITIS C SCREENING  Completed    Pneumococcal Vaccine 0-64  Aged Out

## 2023-08-21 ENCOUNTER — OFFICE VISIT (OUTPATIENT)
Dept: SURGERY | Facility: CLINIC | Age: 53
End: 2023-08-21
Payer: COMMERCIAL

## 2023-08-21 VITALS
WEIGHT: 225.7 LBS | DIASTOLIC BLOOD PRESSURE: 100 MMHG | BODY MASS INDEX: 34.21 KG/M2 | TEMPERATURE: 97.7 F | HEIGHT: 68 IN | OXYGEN SATURATION: 95 % | HEART RATE: 79 BPM | SYSTOLIC BLOOD PRESSURE: 142 MMHG

## 2023-08-21 DIAGNOSIS — K62.9 DISORDER OF ANUS: Primary | ICD-10-CM

## 2023-08-21 DIAGNOSIS — L29.0 PRURITUS ANI: ICD-10-CM

## 2023-08-21 PROBLEM — S82.872A CLOSED PILON FRACTURE OF LEFT TIBIA: Status: ACTIVE | Noted: 2023-03-27
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
## 2023-08-21 RX ORDER — CLOBETASOL PROPIONATE 0.5 MG/G
1 CREAM TOPICAL 2 TIMES DAILY
Qty: 28 G | Refills: 0 | Status: SHIPPED | OUTPATIENT
Start: 2023-08-21 | End: 2023-09-04

## 2023-08-21 NOTE — PROGRESS NOTES
"Jackson William is a 52 y.o. male in for follow up of Disorder of anus    Pruritus ani    Pt presents today for a follow up for perianal pruritus and mucus drainage.   Since his last office visit on 01/24/2023, he started applying Calmoseptine with resolution of the mucus drainage.   The pruritus improved as well, but has not fully resolved.   Pt states he is now experiencing a different type of drainage.   It is clear/brown in coloration.   When asked about the onset of this drainage, he states \"I've always had it\".    Pt states it is very minor, but bothersome.     Since his last office visit, he underwent a colonoscopy on 07/17/2023.  Was originally scheduled for a colonoscopy in 04/2023, but rescheduled after fracturing his left ankle.   During the exam, a tubular adenoma was resected from the transverse colon. Exam was otherwise WNL.  Recall 5 years.     /100 (BP Location: Left arm, Patient Position: Sitting, Cuff Size: Small Adult)   Pulse 79   Temp 97.7 øF (36.5 øC) (Temporal)   Ht 172.7 cm (68\")   Wt 102 kg (225 lb 11.2 oz)   SpO2 95%   BMI 34.32 kg/mý   Body mass index is 34.32 kg/mý.      PE:  Physical Exam  Exam conducted with a chaperone present.   Constitutional:       General: He is not in acute distress.     Appearance: He is well-developed.   HENT:      Head: Normocephalic and atraumatic.   Abdominal:      General: There is no distension.      Palpations: Abdomen is soft.   Genitourinary:     Comments: Perianal exam: External hemorrhoids WNL. Minor skin excoriation circumferentially. Small anal skin tag anteriorly on midline.   JEFF- Good tone, no masses  Anoscopy performed: Internal Hemorrhoids WNL  Musculoskeletal:         General: Normal range of motion.   Neurological:      Mental Status: He is alert.   Psychiatric:         Thought Content: Thought content normal.       Review of Medical Record:    Colonoscopy 07/17/2023:  - 5 mm Tubular Adenoma, Transverse Colon  - Rescope: 5 Years  - " Dr. Cherry Orourke, [facility]    FHx:  Father with Hx of colon polyps.   No known FHx of colon cancer or IBD.     Assessment:   1. Disorder of anus    2. Pruritus ani       Plan:  - For perianal pruritus, will treat with Clobetasol BID x2 weeks. Pt instructed to switch back to Calmoseptine after completing full course of Clobetasol  - As for the drainage he is experiencing, discussed with Pt that this could be sweat, or due to the irritated skin. Will assess for any improvement after treatment with Clobetasol.   - Next colonoscopy due 2028  - Follow up 6 weeks

## 2023-10-12 DIAGNOSIS — I10 ESSENTIAL HYPERTENSION: Chronic | ICD-10-CM

## 2023-10-12 RX ORDER — CANDESARTAN 16 MG/1
16 TABLET ORAL DAILY
Qty: 90 TABLET | Refills: 0 | Status: SHIPPED | OUTPATIENT
Start: 2023-10-12

## 2023-11-01 ENCOUNTER — OFFICE VISIT (OUTPATIENT)
Dept: SURGERY | Facility: CLINIC | Age: 53
End: 2023-11-01
Payer: COMMERCIAL

## 2023-11-01 VITALS
DIASTOLIC BLOOD PRESSURE: 88 MMHG | HEIGHT: 68 IN | HEART RATE: 104 BPM | SYSTOLIC BLOOD PRESSURE: 144 MMHG | OXYGEN SATURATION: 95 % | WEIGHT: 230.5 LBS | BODY MASS INDEX: 34.93 KG/M2

## 2023-11-01 DIAGNOSIS — L29.0 PRURITUS ANI: Primary | ICD-10-CM

## 2023-11-01 RX ORDER — CYCLOBENZAPRINE HCL 10 MG
10 TABLET ORAL 2 TIMES DAILY PRN
COMMUNITY
Start: 2023-10-03 | End: 2024-01-01

## 2023-11-01 RX ORDER — GABAPENTIN 400 MG/1
400 CAPSULE ORAL 4 TIMES DAILY
COMMUNITY
Start: 2023-10-09

## 2023-11-01 NOTE — PROGRESS NOTES
"Jackson William is a 52 y.o. male in for follow up of Pruritus ani    Pt presents today for follow up for pruritus ani.  Was treated with Clobetasol BID x2 weeks then transitioned to barrier cream.   No longer applying barrier cream as he does not feel he needs to.  Symptoms not fully resolved, intermittent itching, but significantly better than it was.     /88 (BP Location: Left arm, Patient Position: Sitting, Cuff Size: Adult)   Pulse 104   Ht 172.7 cm (68\")   Wt 105 kg (230 lb 8 oz)   SpO2 95%   BMI 35.05 kg/m²   Body mass index is 35.05 kg/m².      PE:  Physical Exam  Constitutional:       General: He is not in acute distress.     Appearance: He is well-developed.   HENT:      Head: Normocephalic and atraumatic.   Abdominal:      General: There is no distension.      Palpations: Abdomen is soft.   Musculoskeletal:         General: Normal range of motion.   Neurological:      Mental Status: He is alert.   Psychiatric:         Thought Content: Thought content normal.         Review of Medical Record:     Colonoscopy 07/17/2023:  - 5 mm Tubular Adenoma, Transverse Colon  - Rescope: 5 Years  - Dr. Cherry Orourke, [facility]     FHx:  Father with Hx of colon polyps.   No known FHx of colon cancer or IBD.     Assessment:   1. Pruritus ani    - Improved    Plan:  - Barrier cream as needed to reduce perianal irritation  - Next colonoscopy due 2028  - Follow up as needed      "

## 2024-01-10 DIAGNOSIS — I10 ESSENTIAL HYPERTENSION: Chronic | ICD-10-CM

## 2024-01-10 RX ORDER — CANDESARTAN 16 MG/1
16 TABLET ORAL DAILY
Qty: 90 TABLET | Refills: 1 | Status: SHIPPED | OUTPATIENT
Start: 2024-01-10

## 2024-01-17 ENCOUNTER — TRANSCRIBE ORDERS (OUTPATIENT)
Dept: PHYSICAL THERAPY | Facility: CLINIC | Age: 54
End: 2024-01-17
Payer: COMMERCIAL

## 2024-01-17 DIAGNOSIS — S82.872A CLOSED PILON FRACTURE OF LEFT TIBIA, INITIAL ENCOUNTER: Primary | ICD-10-CM

## 2024-01-18 ENCOUNTER — TREATMENT (OUTPATIENT)
Dept: PHYSICAL THERAPY | Facility: CLINIC | Age: 54
End: 2024-01-18
Payer: OTHER MISCELLANEOUS

## 2024-01-18 DIAGNOSIS — R26.9 ABNORMAL GAIT: ICD-10-CM

## 2024-01-18 DIAGNOSIS — S82.872D CLOSED DISPLACED PILON FRACTURE OF LEFT TIBIA WITH ROUTINE HEALING, SUBSEQUENT ENCOUNTER: Primary | ICD-10-CM

## 2024-01-18 DIAGNOSIS — R29.898 LEFT LEG WEAKNESS: ICD-10-CM

## 2024-01-18 DIAGNOSIS — Z74.09 IMPAIRED FUNCTIONAL MOBILITY, BALANCE, GAIT, AND ENDURANCE: ICD-10-CM

## 2024-01-18 PROCEDURE — 97530 THERAPEUTIC ACTIVITIES: CPT | Performed by: PHYSICAL THERAPIST

## 2024-01-18 PROCEDURE — 97162 PT EVAL MOD COMPLEX 30 MIN: CPT | Performed by: PHYSICAL THERAPIST

## 2024-01-18 NOTE — PROGRESS NOTES
[facility]    Physical Therapy Initial Evaluation and Plan of Care    Patient Name: Jackson William          :  1970  Referring Physician: Saskia Chávez MD  Diagnosis: Closed displaced pilon fracture of left tibia with routine healing, subsequent encounter [K86.547F]    Date of Evaluation: 2024  ______________________________________________________________________    Subjective Evaluation    History of Present Illness  Date of onset: 3/21/2023  Date of surgery: 2023, 2023.  Mechanism of injury: Injury: 2023 - Fall from ladder 16 feet suffering a Pilon Fracture Tib/Fib  while working on [employer] -   XRAY RESULT 3/21/2023  FINDINGS:   Acute displaced comminuted fractures of the distal left tibia and distal left fibula are identified. The ankle mortise is disrupted. The tibial fracture is trimalleolar and involves the tibiotalar joint. Distal tibial bone fragments are displaced laterally, anteriorly and medially. Oblique fracture of the distal diaphysis of the fibula with posteromedial displacement of the distal fibular fragment. The talus is difficult to evaluate due to the multiple overlying tibial and fibular bone fragments. Other visualized bones of the left foot no evidence of acute fracture, dislocation, joint space narrowing or degenerative changes. Bones are well-mineralized without focal erosion. Achilles enthesophyte is present.   Diffuse soft tissue swelling of the left ankle is present. No radiopaque embedded foreign body is identified.   IMPRESSION:   Disrupted left ankle mortise with acute displaced comminuted fractures of the distal tibia (intra-articular trimalleolar) and distal fibular shaft.   Limited evaluation of the left talus due to overlying tibial and fibular bone fragments.     Surgery: 2023, 2023   Procedure Laterality Date   · ANKLE SURGERY Left 2023   Application of multiplanar external fixator, left lower extremity,  Closed reduction of left pilon, s/p fall from ladder 2023   · ANKLE SURGERY Left 2023   Open reduction and internal fixation of left pilon fracture, tibia and fibula. Removal of external fixator under anesthesia.     Since surgery: in April - 12 weeks non-WB-ing -   PWB - / WBAT -   Told not to use crutches by Ortho - told to push thru pain -  Pt tried this until 10/25/2023 noted significantly increased swelling and severe pain - Called MD - 1-2 week wt -Infection ruled out - / w/ MD - told he had posterior tibialis tendonitis -   PT - Severe pain and swelling and spasms -   Able to put a little wt - ([facility] - [facility] Dr. 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  Alleviating factors: Rest / NWB -  Exacerbated by: WB-ing LLE ; AROM; (DF more painful and INV)  Progression: improved    Social Support  Patient lives at: Home with stairs to upstairs, but doesn't need to go up there - 2 Stairs off the porch and Deck.    Diagnostic Tests  Abnormal x-ray: Per Pt - Healing.    Treatments  Current treatment: medication and physical therapy  Patient Goals  Patient/family treatment goals: Decreased pain ; ROM/Mobility, gait / endurance to allow ADLs, work/ climb ladders, and stairs, etc.         ___________________________________________________  Objective          Observations     Additional Ankle/Foot Observation Details  Pt presents w/ (B) crutches / antalgic gait - only able to put about 50% BW thru LLE - Minimal ankle DF/PF, Push-off and heel strike -   Sig atrophy (L) gastroc / LE  Moderate swelling - healed incisions from Ext  fixator and ORIF -   (B) Pes planus -    Tenderness     Additional Tenderness Details  Tender ATFL and along AITFL, along ant/lat scar and up Tib-Fib syndesmosis region ;  (L)     Active Range of Motion   Left Ankle/Foot   Dorsiflexion (ke): 2 degrees with pain  Plantar flexion: 20 degrees with pain  Inversion: 20 degrees with pain  Eversion: 10 degrees     Strength/Myotome Testing     Left Ankle/Foot   Dorsiflexion: 3-  Plantar flexion: 3  Inversion: 3+  Eversion: 3+    Tests     Additional Tests Details  (L) Ankle;  Pain w/ Talar tilt Inv and EV w/ pain laterally  Inv stress (-);  EV stress painful to ant Deltoid lig  (-) Ant/post Drawer -     Swelling   Left Ankle/Foot   Figure 8: 57.5 cm    Right Ankle/Foot   Figure 8: 57 cm      FUNCTIONAL ACTIVITIES:   TAPING / BRACING: NA - Discussed how he may benefit from arch/foot taping -   Assessed crutches, fit, gait pattern w/ cuies to increased normal HS, and toe off with LLE -   Educated Pt on importance of utilizing some form of AD if he is unable to ambulate w/ normal gait w/o one - and why -   Educated Patient on Aquatic Therapy, its purpose / goals, and how it is beneficial. Pt given tour of family changing area, pool, and safety including pool shoes to prevent falls, where to sign in and sit to wait.  Issued Aquatic Handout and reviewed w/ Patient -   Discussed edema control,   Reviewed Xrays w/ Patient post-op   Jt protection, ADL modification; normal gait -     FAAM:  33/84 CL  ___________________________________________________  Assessment & Plan       Assessment  Impairments: abnormal muscle firing, abnormal muscle tone, abnormal or restricted ROM, activity intolerance, impaired physical strength, lacks appropriate home exercise program, pain with function and weight-bearing intolerance   Functional limitations: lifting, walking, uncomfortable because of pain, standing and unable to perform repetitive tasks   Assessment details: Jackson William is a 53 y.o.  year-old male referred to physical therapy for Aquatic Therapy for Closed displaced pilon fracture of left tibia 3/21/2023, s/p Ext fixator  3/22/2023 f/b ORIF.4/12/2023.   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    He is WBAT, but requires AD due to pain.   Jackson is motivated and appropriate for Aquatic Physical Therapy - He has been attending [facility] for land PT up to now - -   Prognosis: good    Goals  Plan Goals: Date to achieve goals:  90 days    Date to achieve STGs:  6 weeks    STG 1 Patient will perform aquatic therapy exercises for hip, knee, foot and ankle, lower extremity , and core  ROM/flexibility, strength and conditioning without increased pain.    STG 2 Patient will demonstrate good postural awareness with standing and walking in pool.    STG 3 Patient will report decreased pain > 25%    STG 4 Patient ambulating w/ one crutich or cane with proper gait pattern -     Date to achieve LTGs:  12 weeks    LTG 1 Patient will demonstrate an independent aquatic HEP for hip, knee, foot and ankle, lower extremity , and core  strength,  ROM/flexibility, conditioning and balance with community resources     LTG 2 Patient will demonstrate increased core strength and balance with use of added resistive equipment.    LTG 3 Pt ambulating w/o AD normal gait pattern for functional distances.    LTG 4 Ankle / LLE Strength > 4+/5 to allow normal gait, up/down stairs reciprocally w/ HR -     LTG 5 Patient will report increased score on FAAM score from 33/84  to > 70/84 %      Plan  Therapy options: will be seen for skilled therapy services  Planned modality interventions: hydrotherapy  Other planned modality interventions: Aquatic therapy  Planned therapy interventions: abdominal trunk stabilization, balance/weight-bearing training, flexibility, functional ROM exercises, strengthening, postural training, neuromuscular re-education, motor  coordination training, gait training, home exercise program, therapeutic activities, stretching and transfer training  Frequency: 2x week  Duration in weeks: 12  Treatment plan discussed with: patient      ___________________________________________________  Manual Therapy:         mins  06910;  Therapeutic Exercise:         mins  29731;     Neuromuscular Oleksandr:        mins  34841;    Therapeutic Activity:     38     mins  55375;   Self Care:                           mins  21638  Ultrasound:          mins  40548;  Iontophoresis:          mins  62298;    Electrical Stimulation:         mins  68838 ( );  Mechanical Traction:          mins  88810  Dry Needling          mins self-pay    Eval:   15   mins    Timed Treatment:   38   mins                  Total Treatment:     53   mins    PT SIGNATURE:     Manolo Pierre, RIVER  DATE TREATMENT INITIATED: 1/18/2024  ___________________________________________________  Initial Certification  Certification Period: 4/17/2024  I certify that the therapy services are furnished while this patient is under my care.  The services outlined above are required by this patient, and will be reviewed every 90 days.     PHYSICIAN: ________________________________  DATE: ______  Saskia Chávez MD        Please sign and return via fax to [phone].. Thank you, [facility].  ______________________________________________________________________  [address] [place] [address]  Phone: [phone] Fax: [phone]

## 2024-01-23 ENCOUNTER — TREATMENT (OUTPATIENT)
Dept: PHYSICAL THERAPY | Facility: CLINIC | Age: 54
End: 2024-01-23
Payer: OTHER MISCELLANEOUS

## 2024-01-23 DIAGNOSIS — Z74.09 IMPAIRED FUNCTIONAL MOBILITY, BALANCE, GAIT, AND ENDURANCE: ICD-10-CM

## 2024-01-23 DIAGNOSIS — S82.872D CLOSED DISPLACED PILON FRACTURE OF LEFT TIBIA WITH ROUTINE HEALING, SUBSEQUENT ENCOUNTER: Primary | ICD-10-CM

## 2024-01-23 DIAGNOSIS — R26.9 ABNORMAL GAIT: ICD-10-CM

## 2024-01-23 DIAGNOSIS — R29.898 LEFT LEG WEAKNESS: ICD-10-CM

## 2024-01-23 PROCEDURE — 97113 AQUATIC THERAPY/EXERCISES: CPT | Performed by: PHYSICAL THERAPIST

## 2024-01-23 NOTE — PROGRESS NOTES
Physical Therapy Daily Treatment Note    [facility]  [address] [place] [address]  [phone] (phone)  [phone] (fax)    Patient: Jackson William   : 1970  Diagnosis/ICD-10 Code:  Closed displaced pilon fracture of left tibia with routine healing, subsequent encounter [S82.872D]  Referring practitioner: Saskia Chávez MD  Date of Initial Visit: Type: THERAPY  Noted: 2024  Today's Date: 2024  Patient seen for 2 sessions             Subjective Evaluation    History of Present Illness    Subjective comment: Pain about 6/10, ankle is tight/swollen.       Objective   Entered / exited pool via steps non reciprocally with B UE on single HR for support - SBA/supervision.    AQUATIC EX:    Water Walk   Forward x 2 laps (deep w/ noodle support)  Stretch 1   Calf 20 sec x 2 - once standing, once longsitting with small noodle - noted pain to be about the same with both  Stretch 2   -   Stretch 3     Stretch Other 1  -  Stretch Other 2  -  Vertical Traction  -  Abdominals   LN x 15, back at wall  Clams    -  Hip Abd/Add   L x 15, R x 10  Hip Flex/Ext   L x 15, R x 10  March in Place  15x  Mini Squat   12x  Toe/Heel Raises  10x, trying to put equal weight on B LEs  Uni-Squat   -  Uni-Clock   -  Step Ups   -  Bicycle   2-3 min, suspended, 1 min seated with intention  Flutter/Scissor  - / 20, seated  Exercise 1   Seated L ankle DF/PF, INV/EV x 20 ea  Exercise 2   L Ankle circles x 10 ea direction  Exercise 3   L Ankle alphabet x 1  Exercise 4   -  Exercise 5  -  Exercise 6  -        Assessment & Plan       Assessment  Assessment details: Patient seen today for initial aquatic therapy session including education and instruction in basic aquatic ex/activity for mobility, flexibility, and strength/stabilization.  Majority of WB ex/activity performed in deeper water to improve ability to WB on L LE and minimize antalgia / compensation.  Instructed him to stand  tall, try to WB equally on B LE, focus on actively engaging desired muscles, and use UE support as needed.  He noted anterior ankle pain and posterior/lateral ankle pain in addition to pulling in calf/achilles with attempted calf stretching.  PT provided demonstration and cuing throughout session for optimal posture, core/glut activation, and correct form/technique with ex/activity.    Plan:  Assess response to initial aquatic session and modify/progress as appropriate.                    Timed:  Aquatic Therapy    35     mins 52519;    Angelita Sweet PT  Physical Therapist    [place] License: [id number]

## 2024-01-24 NOTE — PROGRESS NOTES
Physical Therapy Daily Treatment Note    [facility]  [address] [place] [address]  [phone] (phone)  [phone] (fax)    Patient: Jackson William   : 1970  Diagnosis/ICD-10 Code:  Closed displaced pilon fracture of left tibia with routine healing, subsequent encounter [S82.171C]  Referring practitioner: Saskia Chávez MD  Date of Initial Visit: Type: THERAPY  Noted: 2024  Today's Date: 2024  Patient seen for 3 sessions    Visit Diagnoses:    ICD-10-CM ICD-9-CM   1. Closed displaced pilon fracture of left tibia with routine healing, subsequent encounter  S82.732P V54.19   2. Abnormal gait  R26.9 781.2   3. Left leg weakness  R29.898 729.89   4. Impaired functional mobility, balance, gait, and endurance  Z74.09 V49.89              Subjective   Pt reports that his ankle feels no more stiff or painful than normal after his first aqua PT session.    Objective   See Exercise, Manual, and Modality Logs for complete treatment.     Objective   Entered / exited pool via steps non reciprocally with B UE on single HR for support - SBA.     AQUATIC EX:     Water Walk                 Forward x 6  laps (deep w/ noodle support)  Stretch 1                      Calf 20 sec x 2 -  standing  Stretch 2                      -   Stretch 3                        Stretch Other 1           -  Stretch Other 2           -  Vertical Traction          -  Abdominals                 LN x 15, back at wall *defer  Clams                          -  Hip Abd/Add                L x 15, R x 10  Hip Flex/Ext                 L x 15, R x 10  March in Place            15x  Mini Squat                   12x  Toe/Heel Raises         10x, trying to put equal weight on B Les *defer  Uni-Squat                    -  Uni-Clock                    -  Step Ups                     -  Bicycle                         2-3 min, suspended, 1 min seated with intention  Flutter/Scissor             - / 20,  seated  Exercise 1                   Seated L ankle DF/PF, INV/EV x 20 ea  Exercise 2                   L Ankle circles x 10 ea direction  Exercise 3                   L Ankle alphabet x 1  Exercise 4                   -  Exercise 5                   -  Exercise 6                   -    Assessment/Plan  Pt tolerated session well overall, reporting an appropriate level of muscle fatigue following strength exercises. Pt demonstrated 1 LOB while descending stairs w/ HR support and SBA from PT. Pt was already in the water when he fell and landed on his butt. Pt was able to independently stand on his own. Continue PT POC.          Timed:  Aquatic Therapy    28     mins 64573;    Radha Hopson PT  Physical Therapist    [place] License: [id number]

## 2024-01-25 ENCOUNTER — OFFICE VISIT (OUTPATIENT)
Dept: INTERNAL MEDICINE | Age: 54
End: 2024-01-25
Payer: COMMERCIAL

## 2024-01-25 ENCOUNTER — TREATMENT (OUTPATIENT)
Dept: PHYSICAL THERAPY | Facility: CLINIC | Age: 54
End: 2024-01-25
Payer: OTHER MISCELLANEOUS

## 2024-01-25 VITALS
SYSTOLIC BLOOD PRESSURE: 124 MMHG | HEART RATE: 77 BPM | WEIGHT: 230 LBS | DIASTOLIC BLOOD PRESSURE: 88 MMHG | TEMPERATURE: 97.1 F | OXYGEN SATURATION: 98 % | HEIGHT: 68 IN | BODY MASS INDEX: 34.86 KG/M2

## 2024-01-25 DIAGNOSIS — Z74.09 IMPAIRED FUNCTIONAL MOBILITY, BALANCE, GAIT, AND ENDURANCE: ICD-10-CM

## 2024-01-25 DIAGNOSIS — R26.9 ABNORMAL GAIT: ICD-10-CM

## 2024-01-25 DIAGNOSIS — E66.09 CLASS 1 OBESITY DUE TO EXCESS CALORIES WITH SERIOUS COMORBIDITY AND BODY MASS INDEX (BMI) OF 34.0 TO 34.9 IN ADULT: Primary | Chronic | ICD-10-CM

## 2024-01-25 DIAGNOSIS — R29.898 LEFT LEG WEAKNESS: ICD-10-CM

## 2024-01-25 DIAGNOSIS — R73.09 ELEVATED HEMOGLOBIN A1C: Chronic | ICD-10-CM

## 2024-01-25 DIAGNOSIS — E78.2 MIXED HYPERLIPIDEMIA: Chronic | ICD-10-CM

## 2024-01-25 DIAGNOSIS — S82.872D CLOSED DISPLACED PILON FRACTURE OF LEFT TIBIA WITH ROUTINE HEALING, SUBSEQUENT ENCOUNTER: Primary | ICD-10-CM

## 2024-01-25 DIAGNOSIS — S82.872S CLOSED DISPLACED PILON FRACTURE OF LEFT TIBIA, SEQUELA: ICD-10-CM

## 2024-01-25 DIAGNOSIS — I10 PRIMARY HYPERTENSION: Chronic | ICD-10-CM

## 2024-01-25 RX ORDER — CYCLOBENZAPRINE HCL 10 MG
10 TABLET ORAL NIGHTLY
COMMUNITY
Start: 2024-01-12

## 2024-01-25 NOTE — ASSESSMENT & PLAN NOTE
Lab Results   Component Value Date    GLUCOSE 101 (H) 07/18/2023    GLUCOSE 99 06/15/2022    GLUCOSE 106 (H) 12/03/2021     Lab Results   Component Value Date    HGBA1C 5.60 07/18/2023    HGBA1C 5.6 06/15/2022    HGBA1C 5.7 (H) 12/03/2021      Maintain a low sugar/starch/carbohydrate diet.   Starting Zepbound for weight loss.

## 2024-01-25 NOTE — PROGRESS NOTES
I N T E R N A L  M E D I C I N E    J U N O H  K I M,  M D      ENCOUNTER DATE:  01/25/2024    Jackson DOTSON Stewart / 53 y.o. / male    CHIEF COMPLAINT / REASON FOR OFFICE VISIT     Hypertension, Hyperlipidemia, and Obesity      ASSESSMENT & PLAN     Problem List Items Addressed This Visit          High    Class 1 obesity due to excess calories with serious comorbidity and body mass index (BMI) of 34.0 to 34.9 in adult - Primary (Chronic)    Current Assessment & Plan     Ongoing weight gain since foot injury which is slowing his rehab progress. BMI is now 34.97.    Discussed GLP-1 agonist. He would like to initiate therapy for weight loss. He has no absolute contraindications.     Start Zepbound 2.5 mg weekly. Info provided in AVS and discussed verbally. Update me on weight change and tolerability in 3 weeks.     Wt Readings from Last 3 Encounters:   01/25/24 104 kg (230 lb)   11/01/23 105 kg (230 lb 8 oz)   08/21/23 102 kg (225 lb 11.2 oz)   Body mass index is 34.97 kg/m².            Relevant Medications    Tirzepatide-Weight Management (ZEPBOUND) 2.5 MG/0.5ML solution auto-injector       Medium    Hyperlipidemia (Chronic)    Overview     Continue rosuvastatin 10 mg qd.          Relevant Medications    rosuvastatin (CRESTOR) 10 MG tablet    Hypertension (Chronic)    Overview     Continue candesartan 16 mg and amlodipine 10 mg qd.          Relevant Medications    amLODIPine (NORVASC) 10 MG tablet    candesartan (ATACAND) 16 MG tablet    Elevated hemoglobin A1c (Chronic)    Current Assessment & Plan     Lab Results   Component Value Date    GLUCOSE 101 (H) 07/18/2023    GLUCOSE 99 06/15/2022    GLUCOSE 106 (H) 12/03/2021     Lab Results   Component Value Date    HGBA1C 5.60 07/18/2023    HGBA1C 5.6 06/15/2022    HGBA1C 5.7 (H) 12/03/2021      Maintain a low sugar/starch/carbohydrate diet.   Starting Zepbound for weight loss.             Low    Closed displaced pilon fracture of left tibia     No orders of the defined  "types were placed in this encounter.    New Medications Ordered This Visit   Medications    Tirzepatide-Weight Management (ZEPBOUND) 2.5 MG/0.5ML solution auto-injector     Sig: Inject 0.5 mL under the skin into the appropriate area as directed 1 (One) Time Per Week.     Dispense:  2 mL     Refill:  0       SUMMARY/DISCUSSION        Next Appointment with me: Visit date not found    Return in about 3 months (around 4/25/2024) for Obesity.      VITAL SIGNS     Vitals:    01/25/24 0729   BP: 124/88   Pulse: 77   Temp: 97.1 °F (36.2 °C)   SpO2: 98%   Weight: 104 kg (230 lb)   Height: 172.7 cm (68\")       BP Readings from Last 3 Encounters:   01/25/24 124/88   11/01/23 144/88   08/21/23 142/100     Wt Readings from Last 3 Encounters:   01/25/24 104 kg (230 lb)   11/01/23 105 kg (230 lb 8 oz)   08/21/23 102 kg (225 lb 11.2 oz)     Body mass index is 34.97 kg/m².    Blood pressure readings recorded on patient flowsheet:       No data to display                  MEDICATIONS AT THE TIME OF OFFICE VISIT     Current Outpatient Medications on File Prior to Visit   Medication Sig    amLODIPine (NORVASC) 10 MG tablet TAKE 1 TABLET BY MOUTH DAILY    candesartan (ATACAND) 16 MG tablet TAKE 1 TABLET BY MOUTH DAILY    cyclobenzaprine (FLEXERIL) 10 MG tablet Take 1 tablet by mouth Every Night.    fexofenadine (ALLEGRA ALLERGY) 180 MG tablet Take 1 tablet by mouth Daily.    fluticasone (FLONASE) 50 MCG/ACT nasal spray 2 sprays into the nostril(s) as directed by provider Daily. Administer 2 sprays in each nostril for each dose.    Multiple Vitamin (MULTI-VITAMIN) tablet Take 1 tablet by mouth Daily.    rosuvastatin (CRESTOR) 10 MG tablet TAKE 1 TABLET BY MOUTH EVERY NIGHT    sildenafil (REVATIO) 20 MG tablet Take 3-5 tablets prior to sexual activity.    vitamin D3 125 MCG (5000 UT) capsule capsule Take 1 capsule by mouth Daily.    [DISCONTINUED] calcium carbonate, oyster shell, 500 MG tablet tablet Take 1 tablet by mouth Every 12 " (Twelve) Hours.    gabapentin (NEURONTIN) 400 MG capsule Take 1 capsule by mouth 4 (Four) Times a Day. (Patient not taking: Reported on 1/25/2024)     No current facility-administered medications on file prior to visit.          HISTORY OF PRESENT ILLNESS     Jackson suffered a displaced pilon fracture of left tibia. He mentions in 10/2023 he was ambulating with a cane, but developed tendonitis and has been slow to heal. He notes the bone has almost healed, but he continues to experience pain from the tendonitis and utilizes crutches for ambulation. Reluctant to take ibuprofen for pain due to his history of hypertension. He is currently under worker's compensation as the injury was work related. Denies any significant mood changes other than increased stress trying to help his dad who recently fell and broke his hip.      He gained about 10 pounds since his injury, as his activity level is limited. His current BMI is 34.97 kg/m2. He tries to make good diet choices but continues to gain weight. He is interested in weight loss medication.     His blood pressure is well controlled today at 124/88mmHg. Currently managed on amlodipine 10 mg daily and candesartan 16 mg daily.    On rosuvastatin 10 mg daily for hyperlipidemia.     He is no longer taking gabapentin.      Patient Care Team:  Kelby Seo MD as PCP - General (Internal Medicine)  Shraddha Parsons PA-C as Physician Assistant (Colon and Rectal Surgery)    REVIEW OF SYSTEMS     Review of Systems   GI negative     PHYSICAL EXAMINATION     Physical Exam  Alert with normal thought and judgment.   Obesity   Ambulates with crutches       REVIEWED DATA     Labs:     Lab Results   Component Value Date     07/18/2023    K 4.8 07/18/2023    CALCIUM 9.7 07/18/2023    AST 14 07/18/2023    ALT 27 07/18/2023    BUN 14 07/18/2023    CREATININE 0.97 07/18/2023    CREATININE 1.02 06/15/2022    CREATININE 0.96 12/03/2021    EGFRIFNONA 92 12/03/2021    EGFRIFAFRI 106  "12/03/2021       Lab Results   Component Value Date    HGBA1C 5.60 07/18/2023    HGBA1C 5.6 06/15/2022    HGBA1C 5.7 (H) 12/03/2021       Lab Results   Component Value Date    LDL 59 07/18/2023    LDL 75 06/15/2022    LDL 91 06/07/2021    HDL 47 07/18/2023    HDL 45 06/15/2022    TRIG 108 07/18/2023    TRIG 147 06/15/2022       Lab Results   Component Value Date    TSH 2.940 07/18/2023    TSH 2.300 01/29/2021    TSH 4.250 (H) 07/20/2020    FREET4 1.29 07/18/2023    FREET4 1.07 01/29/2021    FREET4 1.03 07/20/2020       Lab Results   Component Value Date    WBC 4.55 07/18/2023    HGB 14.5 07/18/2023     07/18/2023       No results found for: \"MALBCRERATIO\"         Imaging:           Medical Tests:           Summary of old records / correspondence / consultant report:           Request outside records:       Transcribed from ambient dictation for Kelby Seo MD by Dorothea Dye.  01/25/24   08:48 EST    Patient or patient representative verbalized consent to the visit recording.  I have personally performed the services described in this document as transcribed by the above individual, and it is both accurate and complete.  Kelby Seo MD  1/25/2024  13:14 EST       "

## 2024-01-25 NOTE — ASSESSMENT & PLAN NOTE
Ongoing weight gain since foot injury which is slowing his rehab progress. BMI is now 34.97.    Discussed GLP-1 agonist. He would like to initiate therapy for weight loss. He has no absolute contraindications.     Start Zepbound 2.5 mg weekly. Info provided in AVS and discussed verbally. Update me on weight change and tolerability in 3 weeks.     Wt Readings from Last 3 Encounters:   01/25/24 104 kg (230 lb)   11/01/23 105 kg (230 lb 8 oz)   08/21/23 102 kg (225 lb 11.2 oz)     Body mass index is 34.97 kg/m².

## 2024-01-25 NOTE — PATIENT INSTRUCTIONS
** IMPORTANT MESSAGE FROM DR. NAVARRO **    In our office, your satisfaction is VERY important to us.     You may receive a survey from [facility] by mail or E-mail for you to provide feedback about your visit. This information is invaluable for me to know what we can do to improve our services.     I ask that you please take a few minutes to complete the survey and let us know how we are doing in serving your needs. (You may receive the survey more than once for multiple visits)    Thank You !    Dr. Navarro    _________________________________________________________________________________________________________________________      ** ADDITIONAL INSTRUCTION / REMINDERS FROM DR. NAVARRO **

## 2024-01-30 ENCOUNTER — TREATMENT (OUTPATIENT)
Dept: PHYSICAL THERAPY | Facility: CLINIC | Age: 54
End: 2024-01-30
Payer: OTHER MISCELLANEOUS

## 2024-01-30 DIAGNOSIS — S82.872D CLOSED DISPLACED PILON FRACTURE OF LEFT TIBIA WITH ROUTINE HEALING, SUBSEQUENT ENCOUNTER: Primary | ICD-10-CM

## 2024-01-30 DIAGNOSIS — R26.9 ABNORMAL GAIT: ICD-10-CM

## 2024-01-30 DIAGNOSIS — R29.898 LEFT LEG WEAKNESS: ICD-10-CM

## 2024-01-30 DIAGNOSIS — Z74.09 IMPAIRED FUNCTIONAL MOBILITY, BALANCE, GAIT, AND ENDURANCE: ICD-10-CM

## 2024-01-30 PROCEDURE — 97113 AQUATIC THERAPY/EXERCISES: CPT | Performed by: PHYSICAL THERAPIST

## 2024-01-30 NOTE — PROGRESS NOTES
Physical Therapy Daily Treatment Note    [facility]  [address] [place] [address]  [phone] (phone)  [phone] (fax)    Patient: Jackson William   : 1970  Diagnosis/ICD-10 Code:  Closed displaced pilon fracture of left tibia with routine healing, subsequent encounter [S82.872D]  Referring practitioner: Saskia Chávez MD  Date of Initial Visit: Type: THERAPY  Noted: 2024  Today's Date: 2024  Patient seen for 4 sessions             Subjective     Objective     Entered / exited pool via steps non reciprocally with B UE on single HR for support - SBA.     AQUATIC EX:     Water Walk                 Forward x 6  laps (deep water), backwards x 3 laps (deep water)  March Walk  ?Next  Stretch 1                      Calf 20 sec x 2 -  standing  Stretch 2                      -   Stretch 3                        Stretch Other 1           -  Stretch Other 2           -  Vertical Traction          -  Abdominals                 LN x 15, away from wall  Clams                          -  Hip Abd/Add                15x ea  Hip Flex/Ext                 15x ea  March in Place            15x  Mini Squat                   15x  Toe/Heel Raises         10x, trying to put equal weight on B LEs - *deferred  Uni-Squat                    -  Uni-Clock                    -  Step Ups                     -  Bicycle                         2-3 min, suspended, 1 min seated with intention  Flutter/Scissor             - / 20, seated  Exercise 1                   Seated L ankle DF/PF, INV/EV x 20 ea  Exercise 2                   L Ankle circles x 10 ea direction  Exercise 3                   L Ankle alphabet x 1  Exercise 4                   -  Exercise 5                   -  Exercise 6                   -      Assessment & Plan       Assessment  Assessment details: Patient reports typical ankle pain/soreness and denies any increased symptoms following aquatic therapy session.   Continued with previous aquatic ex/activity for mobility, flexibility, and strength/stabilization.  Increased reps on a few ex and added backward walking this visit.  He completed noodle pushdowns away from wall today.  Emphasis on trying to maintain equal WB on B LE, actively engaging abdominals / gluts, and performing ex with controlled movement in comfortable range.  Majority of WB ex/activity performed in deeper water to improve ability to WB on L LE and minimize antalgia / compensation.  Demonstration and cuing provided throughout session for optimal posture, core/glut activation, and correct form/technique with ex/activity.    Plan:  Continue to assess patient response to aquatic ex/activity and modify/progress as appropriate.  May consider adding STS, hip extension, leg press, and/or trial of march walking in future sessions.                   Timed:  Aquatic Therapy    35     mins [id number];    Angelita Sweet PT  Physical Therapist    [place] License: [id number]

## 2024-02-01 ENCOUNTER — TREATMENT (OUTPATIENT)
Dept: PHYSICAL THERAPY | Facility: CLINIC | Age: 54
End: 2024-02-01
Payer: OTHER MISCELLANEOUS

## 2024-02-01 DIAGNOSIS — S82.872D CLOSED DISPLACED PILON FRACTURE OF LEFT TIBIA WITH ROUTINE HEALING, SUBSEQUENT ENCOUNTER: Primary | ICD-10-CM

## 2024-02-01 DIAGNOSIS — R29.898 LEFT LEG WEAKNESS: ICD-10-CM

## 2024-02-01 DIAGNOSIS — R26.9 ABNORMAL GAIT: ICD-10-CM

## 2024-02-01 DIAGNOSIS — Z74.09 IMPAIRED FUNCTIONAL MOBILITY, BALANCE, GAIT, AND ENDURANCE: ICD-10-CM

## 2024-02-01 PROCEDURE — 97113 AQUATIC THERAPY/EXERCISES: CPT | Performed by: PHYSICAL THERAPIST

## 2024-02-01 NOTE — PROGRESS NOTES
Physical Therapy Daily Treatment Note    [facility]  [address] [place] [address]  [phone] (phone)  [phone] (fax)    Patient: Jackson William   : 1970  Diagnosis/ICD-10 Code:  Closed displaced pilon fracture of left tibia with routine healing, subsequent encounter [S82.872D]  Referring practitioner: Saskia Chávez MD  Date of Initial Visit: Type: THERAPY  Noted: 2024  Today's Date: 2024  Patient seen for 5 sessions             Subjective Evaluation    History of Present Illness    Subjective comment: No issues following last session, just my typical ankle stiffness / tightness / pain.  The posterior/lateral ankle and leg pain is still there but getting better - I haven't had any spasms for about a month.       Objective     Entered / exited pool via floor transfer pool deck <-> bench in pool instead of using steps.     AQUATIC EX:     Water Walk                 Forward x 6  laps (deep water), backwards x 3 laps (deep water)  March Walk                 3 laps (deep water)  Stretch 1                      Calf 20 sec x 2 -  standing  Stretch 2                      -   Stretch 3                        Stretch Other 1           -  Stretch Other 2           -  Vertical Traction          -  Abdominals                 LN x 15, away from wall  Clams                          -  Hip Abd/Add                15x ea  Hip Flex/Ext                 15x ea  March in Place            15x - *deferred  Mini Squat                   15x  Toe/Heel Raises         12x, trying to put equal weight on B LEs  Leg press   10x ea vs large blue aqua ring  Uni-Squat                    -  Uni-Clock                    -  Step Ups                     -  STS from pool bench 10x, no hands99*  Bicycle                         2-3 min, suspended, 1 min seated with intention  Flutter/Scissor             - / 20, seated  Exercise 1                   alt paddle rows x 12 ea, closed  paddles  Exercise 2                   UE Hz Abd x 12, hands only  Exercise 3                   Seated L ankle DF/PF, INV/EV x 20 ea  Exercise 4                   L Ankle circles x 10 ea direction  Exercise 5                   L Ankle alphabet x 1  Exercise 6                   -      Assessment & Plan       Assessment  Assessment details: Patient reports typical ankle pain/soreness and denies any increased symptoms following aquatic therapy session.  Continued with previous aquatic ex/activity for mobility, flexibility, and strength/stabilization.  Added added leg press, UE Hz abd, alt rows, march walking, and STS this visit.  He could feel increased balance challenge in LEs with march walking and UE ex.  He needed cuing to try to WB equally on B LEs during STS which he was able to do but was difficult and he noted some anterior ankle pain with the ex.  Instructed him to try to WB equally WB on B LE, actively engage abdominals / gluts, and perform ex with good form / quality and control to minimize compensation and optimize benefit.  Majority of WB ex/activity performed in deeper water to improve ability to WB on L LE and minimize antalgia / compensation.  Demonstration and cuing provided throughout session for optimal posture, core/glut activation, and correct form/technique with ex/activity.    Plan:  Continue with aquatic ex/activity and modify/progress as appropriate.  May consider adding hip extension, additional UE/core ex, and possibly some more balance ex/activity in future sessions.                   Timed:  Aquatic Therapy    41     mins [id number];    Angelita Sweet PT  Physical Therapist    [place] License: [id number]

## 2024-02-06 ENCOUNTER — TREATMENT (OUTPATIENT)
Dept: PHYSICAL THERAPY | Facility: CLINIC | Age: 54
End: 2024-02-06
Payer: OTHER MISCELLANEOUS

## 2024-02-06 DIAGNOSIS — R26.9 ABNORMAL GAIT: ICD-10-CM

## 2024-02-06 DIAGNOSIS — S82.872D CLOSED DISPLACED PILON FRACTURE OF LEFT TIBIA WITH ROUTINE HEALING, SUBSEQUENT ENCOUNTER: Primary | ICD-10-CM

## 2024-02-06 DIAGNOSIS — R29.898 LEFT LEG WEAKNESS: ICD-10-CM

## 2024-02-06 DIAGNOSIS — Z74.09 IMPAIRED FUNCTIONAL MOBILITY, BALANCE, GAIT, AND ENDURANCE: ICD-10-CM

## 2024-02-06 PROCEDURE — 97113 AQUATIC THERAPY/EXERCISES: CPT | Performed by: PHYSICAL THERAPIST

## 2024-02-06 NOTE — PROGRESS NOTES
Physical Therapy Daily Treatment Note    [facility]  [address] [place] [address]  [phone] (phone)  [phone] (fax)    Patient: Jackson William   : 1970  Diagnosis/ICD-10 Code:  Closed displaced pilon fracture of left tibia with routine healing, subsequent encounter [S82.872D]  Referring practitioner: Saskia Chávez MD  Date of Initial Visit: Type: THERAPY  Noted: 2024  Today's Date: 2024  Patient seen for 6 sessions             Subjective Evaluation    History of Present Illness    Subjective comment: I did okay after last time.       Objective     Entered / exited pool via floor transfer pool deck <-> bench in pool instead of using steps.     AQUATIC EX:     Water Walk                 Forward x 6  laps (deep water), backwards x 3 laps (deep water) - on own; sideways x 2 laps (deep water)  March Walk                 3 laps (deep water)  Stretch 1                      Calf 20 sec x 2 ea -  standing  Stretch 2                      -   Stretch 3                      -  Stretch Other 1           -  Stretch Other 2           -  Vertical Traction          -  Abdominals                 solid white noodle push downs x 15, away from wall  Clams                          -  Hip Abd/Add                15x ea  Hip Flex/Ext                 15x ea  March in Place            15x - *deferred  Mini Squat                   15x  Toe/Heel Raises         15x, trying to put equal weight on B LEs  Leg press                    12x ea vs large blue aqua ring  Uni-Squat                    -  Uni-Clock                    -  Step Ups                     -  STS from pool bench 10x, no hands  Bicycle                         2-3 min, suspended with intention  Flutter/Scissor             - / 20, seated - *deferred  Exercise 1                   alt paddle rows x 15 ea, closed paddles  Exercise 2                   UE Hz Abd x 15, hands only  Exercise 3                   Seated  L ankle DF/PF, INV/EV x 20 ea - on own  Exercise 4                   L Ankle circles x 10 ea direction - on own  Exercise 5                   L Ankle alphabet x 1  Exercise 6                   -            Assessment & Plan       Assessment  Assessment details: Patient reports doing okay after last time and feels like therapy is helping some but progress is really slow.  Continued with previous aquatic ex/activity for mobility, flexibility, and strength/stabilization.  Increased reps on some ex and tried alt UE flex/ext this visit.  He was more steady with march walking and did better with keeping nearly equal WB on B LEs during STS today.  Emphasis on upright posture, equal WB B LE with DLS ex/activity, actively engage abdominals / gluts, and performing ex with intention / controlled movement to help improve desired muscle facilitation and minimize compensation.  Majority of WB ex/activity performed in deeper water to improve ability to WB on L LE and minimize antalgia / compensation.  Demonstration and cuing provided throughout session for optimal posture, core/glut activation, and correct form/technique with ex/activity.    Plan:  Continue with aquatic ex/activity and modify/progress as appropriate.  May consider adding hip extension, additional UE/core ex, and possibly some more balance ex/activity in future sessions.                   Timed:  Aquatic Therapy    25     mins [id number];    Angelita Sweet PT  Physical Therapist    [place] License: [id number]

## 2024-02-08 ENCOUNTER — TREATMENT (OUTPATIENT)
Dept: PHYSICAL THERAPY | Facility: CLINIC | Age: 54
End: 2024-02-08
Payer: OTHER MISCELLANEOUS

## 2024-02-08 DIAGNOSIS — S82.872D CLOSED DISPLACED PILON FRACTURE OF LEFT TIBIA WITH ROUTINE HEALING, SUBSEQUENT ENCOUNTER: Primary | ICD-10-CM

## 2024-02-08 DIAGNOSIS — R26.9 ABNORMAL GAIT: ICD-10-CM

## 2024-02-08 DIAGNOSIS — Z74.09 IMPAIRED FUNCTIONAL MOBILITY, BALANCE, GAIT, AND ENDURANCE: ICD-10-CM

## 2024-02-08 DIAGNOSIS — R29.898 LEFT LEG WEAKNESS: ICD-10-CM

## 2024-02-08 PROCEDURE — 97113 AQUATIC THERAPY/EXERCISES: CPT | Performed by: PHYSICAL THERAPIST

## 2024-02-08 NOTE — PROGRESS NOTES
Physical Therapy Daily Treatment Note    [facility]  [address] [place] [address]  [phone] (phone)  [phone] (fax)    Patient: Jackson William   : 1970  Diagnosis/ICD-10 Code:  Closed displaced pilon fracture of left tibia with routine healing, subsequent encounter [S82.872D]  Referring practitioner: Saskia Chávez MD  Date of Initial Visit: Type: THERAPY  Noted: 2024  Today's Date: 2024  Patient seen for 7 sessions             Subjective Evaluation    History of Present Illness    Subjective comment: Doing alright, just have my usual ankle soreness / stiffness / tightness       Objective     Entered / exited pool via floor transfer pool deck <-> bench in pool instead of using steps.     AQUATIC EX:     Water Walk                 Forward x 6  laps (deep water), backwards x 3 laps (deep water); sideways x 2 laps (deep water) - on own  March Walk                 3 laps (deep water)  Stretch 1                      Calf 20 sec x 2 ea -  standing  Stretch 2                      -   Stretch 3                      -  Stretch Other 1           -  Stretch Other 2           -  Vertical Traction          -  Abdominals                 solid white noodle push downs x 20, away from wall  KB push/pull  15x  Clams                          -  Hip Abd/Add                15x ea  Hip Flex/Ext                 15x ea  March in Place            15x - *deferred  Mini Squat                   15x  Toe/Heel Raises         15x, trying to put equal weight on B LEs  Leg press                    15x ea vs large blue aqua ring  Uni-Squat                    -  Uni-Clock                    -  Step Ups                     -  STS from pool bench 15x, no hands  Bicycle                         2-3 min, suspended with intention  Flutter/Scissor             - / 20, seated - *deferred  Exercise 1                   alt paddle rows x 15 ea, yellow hydrotones  Exercise 2                    UE Hz Abd x 15, open paddles  Exercise 3  Alt UE flex/ext x 15 ea, open paddles  Exercise 4                   Seated L ankle DF/PF, INV/EV x 20 ea - on own   Exercise 5                   L Ankle circles x 10 ea direction - on own   Exercise 6                   L Ankle alphabet x 1                    -          Assessment & Plan       Assessment  Assessment details: Patient reports typical soreness/ stiffness / tightness in ankle this morning.  Continued with most previous aquatic ex/activity for mobility, flexibility, and strength/stabilization.  Increased reps on a couple of ex, progressed resistance on UE/core ex, and tried KB push / pull this visit.  His balance/stability was more challenged with greater resistance on UE/core ex.  Emphasis on equal WB B LE during double limb stance activity.  Instructed him to focus on good form /quality and control during ex performance to help improve desired muscle activation and reduce compensation.  Continuing to perform most WB ex/activity performed in deeper water but may consider moving to mid depth as able / tolerated without increased antalgia / compensation.  Demonstration and cuing provided throughout session for optimal posture, core/glut activation, and correct form/technique with ex/activity.    Plan:  Continue skilled therapy progressing ex/activity as appropriate / tolerated.  May consider adding hip extension, additional UE/core ex, and possibly some more balance ex/activity in future sessions.                   Timed:  Aquatic Therapy    30     mins [id number];    Angelita Sweet PT  Physical Therapist    [place] License: [id number]

## 2024-02-09 DIAGNOSIS — E78.2 MIXED HYPERLIPIDEMIA: Chronic | ICD-10-CM

## 2024-02-09 DIAGNOSIS — I10 ESSENTIAL HYPERTENSION: ICD-10-CM

## 2024-02-09 RX ORDER — AMLODIPINE BESYLATE 10 MG/1
TABLET ORAL
Qty: 90 TABLET | Refills: 1 | Status: SHIPPED | OUTPATIENT
Start: 2024-02-09

## 2024-02-09 RX ORDER — ROSUVASTATIN CALCIUM 10 MG/1
10 TABLET, COATED ORAL NIGHTLY
Qty: 90 TABLET | Refills: 1 | Status: SHIPPED | OUTPATIENT
Start: 2024-02-09

## 2024-02-13 ENCOUNTER — TREATMENT (OUTPATIENT)
Dept: PHYSICAL THERAPY | Facility: CLINIC | Age: 54
End: 2024-02-13
Payer: OTHER MISCELLANEOUS

## 2024-02-13 DIAGNOSIS — R26.9 ABNORMAL GAIT: ICD-10-CM

## 2024-02-13 DIAGNOSIS — R29.898 LEFT LEG WEAKNESS: ICD-10-CM

## 2024-02-13 DIAGNOSIS — Z74.09 IMPAIRED FUNCTIONAL MOBILITY, BALANCE, GAIT, AND ENDURANCE: ICD-10-CM

## 2024-02-13 DIAGNOSIS — S82.872D CLOSED DISPLACED PILON FRACTURE OF LEFT TIBIA WITH ROUTINE HEALING, SUBSEQUENT ENCOUNTER: Primary | ICD-10-CM

## 2024-02-13 PROCEDURE — 97113 AQUATIC THERAPY/EXERCISES: CPT | Performed by: PHYSICAL THERAPIST

## 2024-02-15 ENCOUNTER — TREATMENT (OUTPATIENT)
Dept: PHYSICAL THERAPY | Facility: CLINIC | Age: 54
End: 2024-02-15
Payer: OTHER MISCELLANEOUS

## 2024-02-15 DIAGNOSIS — Z74.09 IMPAIRED FUNCTIONAL MOBILITY, BALANCE, GAIT, AND ENDURANCE: ICD-10-CM

## 2024-02-15 DIAGNOSIS — S82.872D CLOSED DISPLACED PILON FRACTURE OF LEFT TIBIA WITH ROUTINE HEALING, SUBSEQUENT ENCOUNTER: Primary | ICD-10-CM

## 2024-02-15 DIAGNOSIS — R26.9 ABNORMAL GAIT: ICD-10-CM

## 2024-02-15 DIAGNOSIS — R29.898 LEFT LEG WEAKNESS: ICD-10-CM

## 2024-02-15 PROCEDURE — 97113 AQUATIC THERAPY/EXERCISES: CPT | Performed by: PHYSICAL THERAPIST

## 2024-02-20 ENCOUNTER — TREATMENT (OUTPATIENT)
Dept: PHYSICAL THERAPY | Facility: CLINIC | Age: 54
End: 2024-02-20
Payer: OTHER MISCELLANEOUS

## 2024-02-20 DIAGNOSIS — S82.872D CLOSED DISPLACED PILON FRACTURE OF LEFT TIBIA WITH ROUTINE HEALING, SUBSEQUENT ENCOUNTER: Primary | ICD-10-CM

## 2024-02-20 DIAGNOSIS — Z74.09 IMPAIRED FUNCTIONAL MOBILITY, BALANCE, GAIT, AND ENDURANCE: ICD-10-CM

## 2024-02-20 DIAGNOSIS — R26.9 ABNORMAL GAIT: ICD-10-CM

## 2024-02-20 DIAGNOSIS — R29.898 LEFT LEG WEAKNESS: ICD-10-CM

## 2024-02-20 PROCEDURE — 97113 AQUATIC THERAPY/EXERCISES: CPT | Performed by: PHYSICAL THERAPIST

## 2024-02-20 NOTE — PROGRESS NOTES
Physical Therapy Daily Treatment Note    [facility]  [address] [place] [address]  [phone] (phone)  [phone] (fax)    Patient: Jackson William   : 1970  Diagnosis/ICD-10 Code:  Closed displaced pilon fracture of left tibia with routine healing, subsequent encounter [S82.872D]  Referring practitioner: Saskia Chávez MD  Date of Initial Visit: Type: THERAPY  Noted: 2024  Today's Date: 2024  Patient seen for 10 sessions             Subjective Evaluation    History of Present Illness    Subjective comment: My ankle always feels stiff/tight.       Objective     AQUATIC EX:     Entered / exited pool via floor transfer pool deck <-> bench in pool instead of using steps.     Water Walk                 Forward x 4  laps (~ 4ft depth), backwards x 3 laps (~ 4ft depth); sideways x 2 laps (~ 4ft depth)  March Walk                 3 laps (deep water)  Toe / Heel Walk          1 lap (1/2 lap ea)  Stretch 1                      Calf 20 sec x 2 ea -  standing  Stretch 2                      -   Stretch 3                      -  Stretch Other 1           -  Stretch Other 2           -  Vertical Traction          -  Abdominals                 solid white noodle push downs x 20, away from wall  KB push/pull                20x  Clams                          -  Hip Abd/Add                15x ea  Hip Flex/Ext                 15x ea - *deferred  March in Place            15x - *deferred  Mini Squat                   20x, shallower (~ 3ft 9in depth)  Toe/Heel Raises         15x, trying to put equal weight on B LEs  Leg press                    20x ea vs large blue aqua ring  Uni clock                     -  Uni-Squat                    -  Uni-Clock                    -  Step Ups                     -  SLS balance                -  STS from pool bench 15x, no hands  Bicycle                         2-3 min, suspended with intention  Flutter/Scissor             - /  "20, seated - *deferred  Exercise 1                   alt rows x 15 ea, yellow hydrotones  Exercise 2                   Pot stirs x 10 ea direction, yellow hydrotone  Exercise 3                   UE Hz Abd x 15, open paddles  Exercise 4                   Alt UE flex/ext x 15 ea, open paddles  Exercise 5  Shoulder abd/add x 15 w/ blue foam dumbbells  Exercise 6  UE \"L\" presses x 10 ea way w/ blue foam dumbbells  Exercise 7                   Seated L ankle DF/PF, INV/EV x 20 ea - on own   Exercise 8                   L Ankle circles x 10 ea direction - on own   Exercise 9                   L Ankle alphabet x 1        Assessment & Plan       Assessment  Assessment details: Patient reports typical ankle stiffness this afternoon.  Continued with most previous aquatic ex/activity for mobility, flexibility, and strength/stabilization. He completed most standing ex in </= 4 ft depth (due to large class occupying deep end of pool) which made it a little more difficult for him maintain equal WB throughout ex/activity.  Tried walking in shallower water but noted increased antalgia/compensation.  He still needs ~ 4 ft depth to work on normalization of gait and minimize antalgia/compensation.  He noted toe walking was more difficult / challenging than heel walking for him.  Added shoulder abd/add and UE \"L\" presses with dumbbells this visit.  Focused on normalization of gait mechanics and actively engaging target muscles during ex/activity to improve ex quality and reduce compensation.  Demonstration and cuing provided throughout session for optimal posture, core/glut activation, and correct form/technique with ex/activity.  Exiting pool area, patient was ambulating with crutches NWB on L LE.      Plan:  Continue skilled therapy progressing ex/activity as appropriate / tolerated.  May consider adding step ups, paddle sweeps, and possibly some more balance ex/activity in future sessions.                     Timed:  Aquatic " Therapy    46     mins 06363;    Angelita Sweet, PT  Physical Therapist    [place] License: [id number]

## 2024-02-22 ENCOUNTER — TREATMENT (OUTPATIENT)
Dept: PHYSICAL THERAPY | Facility: CLINIC | Age: 54
End: 2024-02-22
Payer: OTHER MISCELLANEOUS

## 2024-02-22 DIAGNOSIS — R26.9 ABNORMAL GAIT: ICD-10-CM

## 2024-02-22 DIAGNOSIS — R29.898 LEFT LEG WEAKNESS: ICD-10-CM

## 2024-02-22 DIAGNOSIS — Z74.09 IMPAIRED FUNCTIONAL MOBILITY, BALANCE, GAIT, AND ENDURANCE: ICD-10-CM

## 2024-02-22 DIAGNOSIS — S82.872D CLOSED DISPLACED PILON FRACTURE OF LEFT TIBIA WITH ROUTINE HEALING, SUBSEQUENT ENCOUNTER: Primary | ICD-10-CM

## 2024-02-22 NOTE — PROGRESS NOTES
Physical Therapy Daily Treatment Note    [facility]  [address] [place] [address]  [phone] (phone)  [phone] (fax)    Patient: Jackson William   : 1970  Diagnosis/ICD-10 Code:  Closed displaced pilon fracture of left tibia with routine healing, subsequent encounter [S82.872D]  Referring practitioner: Saskia Chávez MD  Date of Initial Visit: Type: THERAPY  Noted: 2024  Today's Date: 2024  Patient seen for 11 sessions             Subjective Evaluation    History of Present Illness    Subjective comment: I've had a little more pain last  couple of days - not sure if it's from therapy exercises on Tuesday or trying to put a little more weight on L LE when walking around in the house.  Still waiting on brace that was ordered - go back to MD next week. on Tuesday       Objective     AQUATIC EX:     Entered / exited pool via floor transfer pool deck <-> bench in pool instead of using steps.     Water Walk                 Forward x 4  laps (~ 4ft depth), backwards x 3 laps (~ 4ft depth); sideways x 2 laps (~ 4ft depth)  March Walk                 3 laps (deep water)  Toe / Heel Walk          1 lap (1/2 lap ea)  Stretch 1                      Calf 20 sec x 2 ea -  standing  Stretch 2                      -   Stretch 3                      -  Stretch Other 1           -  Stretch Other 2           -  Vertical Traction          -  Abdominals                 solid white noodle push downs x 20, away from wall  KB push/pull                20x  Clams                          -  Hip Abd/Add                15x ea  Hip Flex/Ext                 15x ea - *deferred  March in Place            15x - *deferred  Mini Squat                   20x, shallower (~ 3ft 9in depth)  Toe/Heel Raises         15x, trying to put equal weight on B LEs  Leg press                    20x ea vs solid white noodle  Uni clock                     -  Uni-Squat                     "-  Uni-Clock                    -  Step Ups                     -  SLS balance                2 x 15-20 sec ea   STS from pool bench 15x, no hands - on own  Bicycle                         2-3 min, suspended with intention - on own  Flutter/Scissor             - / 20, seated - *deferred  Exercise 1                   alt rows x 15 ea, yellow hydrotones  Exercise 2                   Pot stirs x 10 ea direction, yellow hydrotone  Exercise 3                   UE Hz Abd x 15, open paddles  Exercise 4                   Alt UE flex/ext x 15 ea, open paddles  Exercise 5                   Shoulder abd/add x 15 w/ blue foam dumbbells  Exercise 6                   UE \"L\" presses x 10 ea way w/ blue foam dumbbells  Exercise 7                   Seated L ankle DF/PF, INV/EV x 20 ea - on own   Exercise 8                   L Ankle circles x 10 ea direction - on own   Exercise 9                   L Ankle alphabet x 1      Assessment & Plan       Assessment  Assessment details: Patient reports he's had a little pain last couple of days that's probably from trying to put more width through L LE when walking around house or maybe from doing the toe walking last time.  Continued with most previous aquatic ex/activity for mobility, flexibility, and strength/stabilization. He completed most standing ex in ~4 ft 3 in depth but did move a little deeper to do toe walking secondary to pain when attempted  Tried SLS balance and tandem walking this visit.  Focused on normalization of heel to toe gait mechanics and performing ex/activity with good form / quality and control to better facilitate desired muscle activation and minimize compensation.  Demonstration and cuing provided throughout session for optimal posture, core/glut activation, and correct form/technique with ex/activity.      Plan:  Continue with skilled therapy progressing ex/activity as appropriate / tolerated.  May consider adding step ups, paddle sweeps, split squats, uneven " squats, lunges, and /or uni clock in future sessions.                     Timed:  Aquatic Therapy    44     mins [id number];    Angelita Sweet PT  Physical Therapist    [place] License: [id number]
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
## 2024-07-08 DIAGNOSIS — I10 ESSENTIAL HYPERTENSION: Chronic | ICD-10-CM

## 2024-07-08 RX ORDER — CANDESARTAN 16 MG/1
16 TABLET ORAL DAILY
Qty: 90 TABLET | Refills: 1 | Status: SHIPPED | OUTPATIENT
Start: 2024-07-08

## 2024-07-16 ENCOUNTER — DOCUMENTATION (OUTPATIENT)
Dept: PHYSICAL THERAPY | Facility: CLINIC | Age: 54
End: 2024-07-16
Payer: COMMERCIAL

## 2024-07-29 ENCOUNTER — OFFICE VISIT (OUTPATIENT)
Dept: INTERNAL MEDICINE | Age: 54
End: 2024-07-29
Payer: COMMERCIAL

## 2024-07-29 VITALS
WEIGHT: 245 LBS | RESPIRATION RATE: 18 BRPM | SYSTOLIC BLOOD PRESSURE: 128 MMHG | BODY MASS INDEX: 37.13 KG/M2 | HEIGHT: 68 IN | TEMPERATURE: 96.8 F | HEART RATE: 78 BPM | OXYGEN SATURATION: 96 % | DIASTOLIC BLOOD PRESSURE: 86 MMHG

## 2024-07-29 DIAGNOSIS — E78.2 MIXED HYPERLIPIDEMIA: Chronic | ICD-10-CM

## 2024-07-29 DIAGNOSIS — I10 PRIMARY HYPERTENSION: Chronic | ICD-10-CM

## 2024-07-29 DIAGNOSIS — Z00.01 ENCOUNTER FOR ROUTINE ADULT HEALTH EXAMINATION WITH ABNORMAL FINDINGS: Primary | ICD-10-CM

## 2024-07-29 DIAGNOSIS — R73.09 ELEVATED HEMOGLOBIN A1C: Chronic | ICD-10-CM

## 2024-07-29 DIAGNOSIS — Z12.5 SCREENING FOR PROSTATE CANCER: ICD-10-CM

## 2024-07-29 DIAGNOSIS — E66.09 CLASS 1 OBESITY DUE TO EXCESS CALORIES WITH SERIOUS COMORBIDITY AND BODY MASS INDEX (BMI) OF 34.0 TO 34.9 IN ADULT: Chronic | ICD-10-CM

## 2024-07-29 DIAGNOSIS — E55.9 VITAMIN D DEFICIENCY: ICD-10-CM

## 2024-07-29 LAB
25(OH)D3+25(OH)D2 SERPL-MCNC: 33.3 NG/ML (ref 30–100)
ALBUMIN SERPL-MCNC: 4.8 G/DL (ref 3.5–5.2)
ALBUMIN/GLOB SERPL: 1.7 G/DL
ALP SERPL-CCNC: 81 U/L (ref 39–117)
ALT SERPL-CCNC: 37 U/L (ref 1–41)
AST SERPL-CCNC: 28 U/L (ref 1–40)
BASOPHILS # BLD AUTO: 0.06 10*3/MM3 (ref 0–0.2)
BASOPHILS NFR BLD AUTO: 1.3 % (ref 0–1.5)
BILIRUB SERPL-MCNC: 0.4 MG/DL (ref 0–1.2)
BUN SERPL-MCNC: 14 MG/DL (ref 6–20)
BUN/CREAT SERPL: 14.9 (ref 7–25)
CALCIUM SERPL-MCNC: 9.4 MG/DL (ref 8.6–10.5)
CHLORIDE SERPL-SCNC: 101 MMOL/L (ref 98–107)
CHOLEST SERPL-MCNC: 159 MG/DL (ref 0–200)
CHOLEST/HDLC SERPL: 3.31 {RATIO}
CO2 SERPL-SCNC: 26.5 MMOL/L (ref 22–29)
CREAT SERPL-MCNC: 0.94 MG/DL (ref 0.76–1.27)
EGFRCR SERPLBLD CKD-EPI 2021: 97.5 ML/MIN/1.73
EOSINOPHIL # BLD AUTO: 0.16 10*3/MM3 (ref 0–0.4)
EOSINOPHIL NFR BLD AUTO: 3.5 % (ref 0.3–6.2)
ERYTHROCYTE [DISTWIDTH] IN BLOOD BY AUTOMATED COUNT: 12.6 % (ref 12.3–15.4)
GLOBULIN SER CALC-MCNC: 2.8 GM/DL
GLUCOSE SERPL-MCNC: 97 MG/DL (ref 65–99)
HBA1C MFR BLD: 5.7 % (ref 4.8–5.6)
HCT VFR BLD AUTO: 44.2 % (ref 37.5–51)
HDLC SERPL-MCNC: 48 MG/DL (ref 40–60)
HGB BLD-MCNC: 14.6 G/DL (ref 13–17.7)
IMM GRANULOCYTES # BLD AUTO: 0.01 10*3/MM3 (ref 0–0.05)
IMM GRANULOCYTES NFR BLD AUTO: 0.2 % (ref 0–0.5)
LDLC SERPL CALC-MCNC: 86 MG/DL (ref 0–100)
LYMPHOCYTES # BLD AUTO: 1.29 10*3/MM3 (ref 0.7–3.1)
LYMPHOCYTES NFR BLD AUTO: 28.4 % (ref 19.6–45.3)
MCH RBC QN AUTO: 29 PG (ref 26.6–33)
MCHC RBC AUTO-ENTMCNC: 33 G/DL (ref 31.5–35.7)
MCV RBC AUTO: 87.7 FL (ref 79–97)
MONOCYTES # BLD AUTO: 0.38 10*3/MM3 (ref 0.1–0.9)
MONOCYTES NFR BLD AUTO: 8.4 % (ref 5–12)
NEUTROPHILS # BLD AUTO: 2.64 10*3/MM3 (ref 1.7–7)
NEUTROPHILS NFR BLD AUTO: 58.2 % (ref 42.7–76)
NRBC BLD AUTO-RTO: 0 /100 WBC (ref 0–0.2)
PLATELET # BLD AUTO: 262 10*3/MM3 (ref 140–450)
POTASSIUM SERPL-SCNC: 4.3 MMOL/L (ref 3.5–5.2)
PROT SERPL-MCNC: 7.6 G/DL (ref 6–8.5)
PSA SERPL-MCNC: 0.81 NG/ML (ref 0–4)
RBC # BLD AUTO: 5.04 10*6/MM3 (ref 4.14–5.8)
SODIUM SERPL-SCNC: 138 MMOL/L (ref 136–145)
T4 FREE SERPL-MCNC: 1.22 NG/DL (ref 0.92–1.68)
TRIGL SERPL-MCNC: 144 MG/DL (ref 0–150)
TSH SERPL DL<=0.005 MIU/L-ACNC: 2.89 UIU/ML (ref 0.27–4.2)
UNABLE TO VOID: NORMAL
VLDLC SERPL CALC-MCNC: 25 MG/DL (ref 5–40)
WBC # BLD AUTO: 4.54 10*3/MM3 (ref 3.4–10.8)

## 2024-07-29 NOTE — PROGRESS NOTES
"    I N T E R N A L  M E D I C I N E    LIBERTY FARRAR PA-C      ENCOUNTER DATE:  07/29/2024    Jackson DOTSON Stewart / 53 y.o. / male    CHIEF COMPLAINT     Annual Exam      VITALS     Vitals:    07/29/24 0848 07/29/24 0900   BP: 140/92 128/86   BP Location: Left arm Right arm   Patient Position: Sitting Sitting   Cuff Size: Adult Adult   Pulse: 78    Resp: 18    Temp: 96.8 °F (36 °C)    TempSrc: Temporal    SpO2: 96%    Weight: 111 kg (245 lb)    Height: 172.7 cm (67.99\")        BP Readings from Last 3 Encounters:   07/29/24 128/86   01/25/24 124/88   11/01/23 144/88     Wt Readings from Last 3 Encounters:   07/29/24 111 kg (245 lb)   01/25/24 104 kg (230 lb)   11/01/23 105 kg (230 lb 8 oz)      Body mass index is 37.26 kg/m².      MEDICATIONS     Current Outpatient Medications on File Prior to Visit   Medication Sig Dispense Refill    amLODIPine (NORVASC) 10 MG tablet TAKE 1 TABLET BY MOUTH DAILY 90 tablet 1    candesartan (ATACAND) 16 MG tablet TAKE 1 TABLET BY MOUTH DAILY 90 tablet 1    fluticasone (FLONASE) 50 MCG/ACT nasal spray 2 sprays into the nostril(s) as directed by provider Daily. Administer 2 sprays in each nostril for each dose.      Multiple Vitamin (MULTI-VITAMIN) tablet Take 1 tablet by mouth Daily.      rosuvastatin (CRESTOR) 10 MG tablet TAKE 1 TABLET BY MOUTH EVERY NIGHT 90 tablet 1    vitamin D3 125 MCG (5000 UT) capsule capsule Take 1 capsule by mouth Daily.      gabapentin (NEURONTIN) 400 MG capsule Take 1 capsule by mouth 4 (Four) Times a Day. (Patient not taking: Reported on 1/25/2024)       No current facility-administered medications on file prior to visit.         HISTORY OF PRESENT ILLNESS      Pt is a 54y/o wm with a history of severe obesity, hypertension, and hyperlipidemia who presents for yearly physical.     Followed by Dr. Chávez of [facility]. He is ambulating with a cane on today, secondary to displaced pilon fracture of the left tibia he suffered in March 21, 2023 " following a 16-foot fall from an extension ladder. Initial surgery for repair was 3/22/23 where external fixator was placed.  Second surgery was 4/12/23 where ORIF was performed. He has only recently discontinued use of his crutches within the last couple months, but still utilizes a cane to ambulate. He also still admits pain and limited mobility to the lower left leg, that is worsened with weightbearing.  He states his pain is well-controlled with rare use of pain medication.    Mentally, he denies depression on today following the limitations of his previous ability and multiple ongoing personal challenges since his injury. 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 He does admit extreme boredom which negatively affects his mood, and changes to sleep pattern.  He is informed of availability of depression counseling and or medications for depression and anxiety.  He declines intervention on today.  Denies depression, SI, passive SI, or HI.    He gained about 15 pounds since his injury, as his activity level is limited. Weight is 245 pounds on today. His current BMI is 37.26 kg/m2. He tries to make good diet choices but admits to lack of success with this, as he continues to gain weight. He was previously prescribed Zepbound, but learned it was over $1200 dollars per month, and cannot afford it.     His blood pressure is elevated on today at 140/92 mmHg, but admits better readings usually. Currently managed on amlodipine 10 mg daily and candesartan 16 mg daily.     On rosuvastatin 10 mg daily for hyperlipidemia.      He is no longer taking gabapentin.        Patient Care Team:  Kelby Seo MD as PCP - General (Internal Medicine)  Shraddha Parsons PA-C as Physician Assistant (Colon and Rectal Surgery)  Saskia Chávez MD (Orthopedic  Surgery)    General health: multiple medical problems  Lifestyle:  Attempting to lose weight?: Yes   Diet: eats decently, has not been eating as healthy, tries to grill more often than not, and snacks excessively  Exercise: generally sedentary and limited physical activity due to health/physical limitations  Tobacco: Occasional/Social use and smokes cigars several times yearly.    Alcohol: 3 days/week, 5+ drinks/occasion, and at times heavier drinking  Work: Disabled  Reproductive health:  Sexually active?: No   Concern for STD?: No   Sexual problems?: erectile dysfunction   Sees Urologist?: No   Depression Screenin/29/2024     8:57 AM   PHQ-2/PHQ-9 Depression Screening   Little Interest or Pleasure in Doing Things 0-->not at all   Feeling Down, Depressed or Hopeless 0-->not at all   PHQ-9: Brief Depression Severity Measure Score 0         PHQ-2: 0 (Not depressed)     PHQ-9: 1-4 (Minimal Depression)    ______________________________________________________________________    ALLERGIES  No Known Allergies     PFSH:     The following portions of the patient's history were reviewed and updated as appropriate: Allergies / Current Medications / Past Medical History / Surgical History / Social History / Family History    PROBLEM LIST   Patient Active Problem List   Diagnosis    Atopic rhinitis    Anemia    Arthralgia of multiple joints    Carpal tunnel syndrome    Hyperlipidemia    Hypertension    Elevated hemoglobin A1c    Suspected sleep apnea    Vitamin D deficiency    Class 1 obesity due to excess calories with serious comorbidity and body mass index (BMI) of 34.0 to 34.9 in adult    History of melanoma    Erectile dysfunction    Hemorrhoids    Disorder of anus    Closed displaced pilon fracture of left tibia    Fall from ladder    Closed pilon fracture of left tibia    Encounter for routine adult health examination with abnormal findings    Screening for prostate cancer       PAST MEDICAL HISTORY  Past  Medical History:   Diagnosis Date    Allergic rhinitis     Anemia     Carpal tunnel syndrome     Colon polyps     FOLLOWED BY DR. SYDNEY ARCHER    Disorder of anus 2023    ED (erectile dysfunction)     Elevated hemoglobin A1c     Erectile dysfunction 2019    History of melanoma 2019    S/p resection (on upper back)()    Hyperlipidemia     Hypertension     Impaired fasting glucose     Malignant melanoma     Patellofemoral disorder of right knee     Reactive depression (situational)     Vitamin D deficiency        SURGICAL HISTORY  Past Surgical History:   Procedure Laterality Date    ANKLE SURGERY  2023    2nd 23    COLONOSCOPY N/A 2019    ANATOMICALLY NORMAL COLON, RESCOPE 5 YEARS. DR. LEONCIO ALCALA @ [facility].    COLONOSCOPY N/A 2023    5 MM TUBULAR ADENOMA POLYP IN TRANSVERSE, RESCOPE IN 5 YRS, DR. SYDNEY ARCHER AT [facility]    DENTAL PROCEDURE  2020    sinus lift    FRACTURE SURGERY  3/22/23    Ankle    SKIN CANCER EXCISION N/A     Melanoma, back    WRIST SURGERY Right     TENDON REPAIR       SOCIAL HISTORY  Social History     Socioeconomic History    Marital status: Single    Number of children: 0   Tobacco Use    Smoking status: Former     Types: Cigars     Quit date:      Years since quittin.5     Passive exposure: Past    Smokeless tobacco: Never   Vaping Use    Vaping status: Never Used    Passive vaping exposure: Yes   Substance and Sexual Activity    Alcohol use: Yes     Alcohol/week: 1.0 standard drink of alcohol     Types: 1 Shots of liquor per week     Comment: OCCASIONAL     Drug use: No    Sexual activity: Not Currently       FAMILY HISTORY  Family History   Problem Relation Age of Onset    Breast cancer Mother     Cancer Mother     Hypertension Father     Colon polyps Father 84        precancerous    Arthritis Father     Hearing loss Father     No Known Problems Sister     Hypertension Brother     Hypertension Brother     Lung cancer  Maternal Grandfather         smoker    Heart failure Paternal Grandfather     Heart attack Paternal Uncle 80    Prostate cancer Neg Hx     Colon cancer Neg Hx     Diabetes Neg Hx     Malig Hyperthermia Neg Hx        IMMUNIZATION HISTORY  Immunization History   Administered Date(s) Administered    Flu Vaccine Quad PF 6-35MO 10/12/2017    Flu Vaccine Quad PF >36MO 10/12/2017    FluMist 2-49yrs 10/12/2017    Fluzone Quad >6mos (Multi-dose) 12/14/2016    Hepatitis A 10/12/2018, 07/29/2020    Influenza Split Preservative Free ID 10/02/2013    Influenza, Unspecified 10/12/2018    TD Preservative Free (Tenivac) 07/25/2023    Tdap 12/14/2016    flucelvax quad pfs =>4 YRS 10/12/2018         REVIEW OF SYSTEMS     Review of Systems   Constitutional: Negative.    Respiratory: Negative.     Musculoskeletal:  Positive for arthralgias, gait problem and joint swelling.   All other systems reviewed and are negative.    As per HPI    PHYSICAL EXAMINATION     Physical Exam  Vitals and nursing note reviewed.   Constitutional:       General: He is not in acute distress.     Appearance: Normal appearance. He is obese. He is not ill-appearing.   HENT:      Head: Normocephalic and atraumatic.      Right Ear: Tympanic membrane, ear canal and external ear normal. There is no impacted cerumen.      Left Ear: Tympanic membrane and ear canal normal. There is no impacted cerumen.      Nose: No congestion.      Mouth/Throat:      Mouth: Mucous membranes are moist.      Pharynx: Oropharynx is clear. No oropharyngeal exudate or posterior oropharyngeal erythema.   Eyes:      Extraocular Movements: Extraocular movements intact.      Conjunctiva/sclera: Conjunctivae normal.      Pupils: Pupils are equal, round, and reactive to light.   Cardiovascular:      Rate and Rhythm: Normal rate and regular rhythm.      Pulses: Normal pulses.      Heart sounds:      No friction rub. No gallop.   Pulmonary:      Effort: Pulmonary effort is normal. No respiratory  "distress.      Breath sounds: Normal breath sounds. No wheezing or rhonchi.   Abdominal:      General: Bowel sounds are normal. There is no distension.      Palpations: Abdomen is soft. There is no mass.      Tenderness: There is no abdominal tenderness. There is no guarding or rebound.   Musculoskeletal:         General: No swelling or deformity. Normal range of motion.      Cervical back: Neck supple.      Left ankle: Swelling present. Tenderness present. Decreased range of motion.   Lymphadenopathy:      Cervical: No cervical adenopathy.   Skin:     General: Skin is warm and dry.      Capillary Refill: Capillary refill takes less than 2 seconds.      Findings: No bruising, erythema, lesion or rash.   Neurological:      General: No focal deficit present.      Mental Status: He is alert and oriented to person, place, and time.      Cranial Nerves: No cranial nerve deficit.      Motor: No weakness.      Coordination: Coordination normal.   Psychiatric:         Mood and Affect: Mood normal.         Behavior: Behavior normal.         Thought Content: Thought content normal.         REVIEWED DATA      Labs:    Lab Results   Component Value Date     07/29/2024    K 4.3 07/29/2024    CALCIUM 9.4 07/29/2024    AST 28 07/29/2024    ALT 37 07/29/2024    BUN 14 07/29/2024    CREATININE 0.94 07/29/2024    CREATININE 0.97 07/18/2023    CREATININE 1.02 06/15/2022    EGFRIFNONA 92 12/03/2021    EGFRIFAFRI 106 12/03/2021       Lab Results   Component Value Date    GLUCOSE 97 07/29/2024    HGBA1C 5.70 (H) 07/29/2024    HGBA1C 5.60 07/18/2023    HGBA1C 5.6 06/15/2022    TSH 2.890 07/29/2024    FREET4 1.22 07/29/2024       Lab Results   Component Value Date    PSA 0.806 07/29/2024    PSA 0.570 07/18/2023    PSA 0.786 07/20/2020       [unfilled]    Lab Results   Component Value Date    LDL 86 07/29/2024    HDL 48 07/29/2024    TRIG 144 07/29/2024    CHOLHDLRATIO 3.31 07/29/2024       No components found for: \"SHLO189W\"    Lab " Results   Component Value Date    WBC 4.54 07/29/2024    HGB 14.6 07/29/2024    MCV 87.7 07/29/2024     07/29/2024       Lab Results   Component Value Date    PROTEIN See below: (A) 07/18/2023    GLUCOSEU Negative 07/18/2023    BLOODU Negative 07/18/2023    NITRITEU Negative 07/18/2023    LEUKOCYTESUR Negative 07/18/2023          Lab Results   Component Value Date    HEPCVIRUSABY <0.1 07/20/2020       Imaging:                   Medical Tests:                 Summary of old records / correspondence / consultant report:               Request outside records:         ASSESSMENT & PLAN     ANNUAL WELLNESS EXAM / PHYSICAL     Other medical problems addressed today:    Next Appointment with me: Visit date not found    Return in about 1 year (around 7/29/2025) for Annual physical with Dr. Seo, and 3 months for Obesity follow-up with Dr. Seo..      HEALTHCARE MAINTENANCE ISSUES       Cancer Screening:  Colon: Initial/Next screening at age: CURRENT  Repeat colon cancer screening: every 3-5 years  Prostate: PSA checked annually but no JEFF needed currently  Testicular: Recommended monthly self exam  Skin: Monthly self skin examination, annual exam by health professional  Lung: Does not meet criteria for lung cancer screening.   Other:    Screening Labs & Tests:  Lab results reviewed & discussed with with patient or orders placed today.  EKG:  CV Screening: No special screening needed and Lipid panel  DEXA (75+ or risk factors):   HEP C (If born 2416-1180 or risk factors): Not indicated  Other:     Immunization/Vaccinations (to be given today unless deferred by patient)  Influenza: Patient deferred/declined flu vaccine (recommended annual vaccination)  Hepatitis A: Not needed at this time  Hepatitis B: Declined by patient  Tetanus/Pertussis: Up to date  Pneumococcal: Recommended here or at pharmacy  Shingles: Recommended Shingrix at pharmacy  COVID: Has never had COVID-19 vaccine and does not plan to take it   RSV:  Recommended vaccine at pharmacy  Lifestyle Counseling:  Lifestyle Modifications: Attempt to lose weight, Continue good lifestyle choices/modifications, Improve dietary compliance, Maintain a low sugar/carbohydrate diet, Follow a low fat, low cholesterol diet, Maintain low sodium diet (< 3 gm) for blood pressure, Make dinner the lightest meal of day, Decrease or avoid alcohol intake, and Improve sleep hygiene  Safety Issues: Always wear seatbelt, Avoid texting while driving   Use sunscreen, regular skin examination  Recommended annual dental/vision examination.  Emotional/Stress/Sleep: Reviewed and  given when appropriate      Health Maintenance   Topic Date Due    COVID-19 Vaccine (1 - 2023-24 season) 07/29/2025 (Originally 9/1/2023)    ZOSTER VACCINE (1 of 2) 07/29/2025 (Originally 11/8/2021)    INFLUENZA VACCINE  08/01/2024    PROSTATE CANCER SCREENING  07/29/2025    ANNUAL PHYSICAL  07/29/2025    LIPID PANEL  07/29/2025    BMI FOLLOWUP  07/29/2025    COLORECTAL CANCER SCREENING  07/17/2028    TDAP/TD VACCINES (3 - Td or Tdap) 07/25/2033    HEPATITIS C SCREENING  Completed    Pneumococcal Vaccine 0-64  Aged Out

## 2024-07-30 PROBLEM — Z00.01 ENCOUNTER FOR ROUTINE ADULT HEALTH EXAMINATION WITH ABNORMAL FINDINGS: Status: ACTIVE | Noted: 2024-07-30

## 2024-07-30 PROBLEM — Z12.5 SCREENING FOR PROSTATE CANCER: Status: ACTIVE | Noted: 2024-07-30

## 2024-07-30 NOTE — ASSESSMENT & PLAN NOTE
Patient's (Body mass index is 37.26 kg/m².) indicates that they are morbidly/severely obese (BMI > 40 or > 35 with obesity - related health condition) with health conditions that include hypertension and dyslipidemias . Weight is worsening. BMI  is above average; no BMI management plan is appropriate. We discussed low calorie, low carb based diet program, portion control, increasing exercise, management of depression/anxiety/stress to control compensatory eating, decreasing alcohol consumption, and Information on healthy weight added to patient's after visit summary.

## 2024-08-07 DIAGNOSIS — I10 ESSENTIAL HYPERTENSION: ICD-10-CM

## 2024-08-07 DIAGNOSIS — E78.2 MIXED HYPERLIPIDEMIA: Chronic | ICD-10-CM

## 2024-08-08 RX ORDER — ROSUVASTATIN CALCIUM 10 MG/1
10 TABLET, COATED ORAL NIGHTLY
Qty: 90 TABLET | Refills: 1 | Status: SHIPPED | OUTPATIENT
Start: 2024-08-08

## 2024-08-08 RX ORDER — AMLODIPINE BESYLATE 10 MG/1
TABLET ORAL
Qty: 90 TABLET | Refills: 1 | Status: SHIPPED | OUTPATIENT
Start: 2024-08-08

## 2024-10-08 ENCOUNTER — OFFICE VISIT (OUTPATIENT)
Dept: INTERNAL MEDICINE | Age: 54
End: 2024-10-08
Payer: COMMERCIAL

## 2024-10-08 VITALS
BODY MASS INDEX: 36.68 KG/M2 | OXYGEN SATURATION: 95 % | SYSTOLIC BLOOD PRESSURE: 144 MMHG | DIASTOLIC BLOOD PRESSURE: 90 MMHG | WEIGHT: 242 LBS | HEIGHT: 68 IN | HEART RATE: 75 BPM | TEMPERATURE: 97.3 F

## 2024-10-08 DIAGNOSIS — E66.812 CLASS 2 SEVERE OBESITY DUE TO EXCESS CALORIES WITH SERIOUS COMORBIDITY AND BODY MASS INDEX (BMI) OF 36.0 TO 36.9 IN ADULT: Chronic | ICD-10-CM

## 2024-10-08 DIAGNOSIS — E66.01 CLASS 2 SEVERE OBESITY DUE TO EXCESS CALORIES WITH SERIOUS COMORBIDITY AND BODY MASS INDEX (BMI) OF 36.0 TO 36.9 IN ADULT: Chronic | ICD-10-CM

## 2024-10-08 DIAGNOSIS — I10 PRIMARY HYPERTENSION: Primary | Chronic | ICD-10-CM

## 2024-10-08 DIAGNOSIS — E78.2 MIXED HYPERLIPIDEMIA: Chronic | ICD-10-CM

## 2024-10-08 PROCEDURE — 99214 OFFICE O/P EST MOD 30 MIN: CPT | Performed by: INTERNAL MEDICINE

## 2024-10-08 RX ORDER — CANDESARTAN 32 MG/1
32 TABLET ORAL DAILY
Qty: 90 TABLET | Refills: 1 | Status: SHIPPED | OUTPATIENT
Start: 2024-10-08

## 2024-10-08 NOTE — ASSESSMENT & PLAN NOTE
BP Readings from Last 3 Encounters:   10/08/24 144/90   07/29/24 128/86   01/25/24 124/88      Increase candesartan to 32 mg daily   Continue amlodipine 10 mg daily   Send BP readings in 1 month.

## 2024-10-08 NOTE — ASSESSMENT & PLAN NOTE
Wt Readings from Last 3 Encounters:   10/08/24 110 kg (242 lb)   07/29/24 111 kg (245 lb)   01/25/24 104 kg (230 lb)     Body mass index is 36.81 kg/m².     Planned ankle surgery in near future.   Advised to look into [employer] for cost saving for Zepbound (insurance excludes weight loss drug coverage)

## 2024-10-08 NOTE — PROGRESS NOTES
J  U  N  O  H    K  I  M ,   M  D                  I  N  T  E  R  N  A  L    M  E  D  I  C  I  N  E         ENCOUNTER DATE:  10/08/2024    Jackson DOTSON Stewart / 53 y.o. / male    OFFICE VISIT ENCOUNTER       CHIEF COMPLAINT / REASON FOR OFFICE VISIT     Obesity and Hypertension      ASSESSMENT & PLAN     Problem List Items Addressed This Visit          High    Hypertension - Primary (Chronic)    Current Assessment & Plan     BP Readings from Last 3 Encounters:   10/08/24 144/90   07/29/24 128/86   01/25/24 124/88      Increase candesartan to 32 mg daily   Continue amlodipine 10 mg daily   Send BP readings in 1 month.          Relevant Medications    amLODIPine (NORVASC) 10 MG tablet    candesartan (ATACAND) 32 MG tablet       Medium    Hyperlipidemia (Chronic)    Overview     Continue rosuvastatin 10 mg qd.          Relevant Medications    rosuvastatin (CRESTOR) 10 MG tablet    Class 2 severe obesity due to excess calories with serious comorbidity and body mass index (BMI) of 36.0 to 36.9 in adult (Chronic)    Current Assessment & Plan     Wt Readings from Last 3 Encounters:   10/08/24 110 kg (242 lb)   07/29/24 111 kg (245 lb)   01/25/24 104 kg (230 lb)     Body mass index is 36.81 kg/m².     Planned ankle surgery in near future.   Advised to look into [employer] for cost saving for Zepbound (insurance excludes weight loss drug coverage)          No orders of the defined types were placed in this encounter.    New Medications Ordered This Visit   Medications    candesartan (ATACAND) 32 MG tablet     Sig: Take 1 tablet by mouth Daily.     Dispense:  90 tablet     Refill:  1       SUMMARY/DISCUSSION        TOTAL TIME OF ENCOUNTER:        Next Appointment with me: Visit date not found    Return in about 4 months (around 2/8/2025) for Reassess chronic medical problems.      VITAL SIGNS     Vitals:    10/08/24 1001   BP: 144/90   Pulse: 75   Temp: 97.3 °F (36.3 °C)   SpO2: 95%   Weight: 110 kg  "(242 lb)   Height: 172.7 cm (67.99\")       BP Readings from Last 3 Encounters:   10/08/24 144/90   07/29/24 128/86   01/25/24 124/88     Wt Readings from Last 3 Encounters:   10/08/24 110 kg (242 lb)   07/29/24 111 kg (245 lb)   01/25/24 104 kg (230 lb)     Body mass index is 36.81 kg/m².    Blood pressure readings recorded on patient flowsheet:       No data to display                  MEDICATIONS AT THE TIME OF OFFICE VISIT     Current Outpatient Medications on File Prior to Visit   Medication Sig    amLODIPine (NORVASC) 10 MG tablet TAKE 1 TABLET BY MOUTH DAILY    fluticasone (FLONASE) 50 MCG/ACT nasal spray Administer 2 sprays into the nostril(s) as directed by provider Daily. Administer 2 sprays in each nostril for each dose.    Multiple Vitamin (MULTI-VITAMIN) tablet Take 1 tablet by mouth Daily.    rosuvastatin (CRESTOR) 10 MG tablet TAKE 1 TABLET BY MOUTH EVERY NIGHT    [DISCONTINUED] candesartan (ATACAND) 16 MG tablet TAKE 1 TABLET BY MOUTH DAILY    vitamin D3 125 MCG (5000 UT) capsule capsule Take 1 capsule by mouth Daily. (Patient not taking: Reported on 10/8/2024)    [DISCONTINUED] gabapentin (NEURONTIN) 400 MG capsule Take 1 capsule by mouth 4 (Four) Times a Day. (Patient not taking: Reported on 1/25/2024)     No current facility-administered medications on file prior to visit.          HISTORY OF PRESENT ILLNESS     History of Present Illness  The patient is here today to follow up on obesity, hyperlipidemia, hypertension, and elevated A1c.    He was previously prescribed Zepbound for weight loss, but due to insurance coverage issues, he has not been taking it. His physical activity is currently limited due to his condition. He is scheduled for ankle surgery on 10/30/2024 and has been advised to discontinue all medications three weeks prior to the procedure.    He does not regularly monitor his blood pressure at home. Although he has a blood pressure machine, it is outdated, and he has been considering " "purchasing a new one from [employer]. His current supply of candesartan is running low, with less than a week's worth remaining.    He is currently taking rosuvastatin 10 mg for cholesterol management and reports no side effects. He has discontinued the use of gabapentin and vitamin D supplements.    IMMUNIZATIONS  He is up-to-date on tetanus and hepatitis A vaccines. He has not had shingles vaccine yet. He does not get COVID-19 and influenza vaccines.       Patient Care Team:  Kelby Seo MD as PCP - General (Internal Medicine)  Shraddha Parsons PA-C as Physician Assistant (Colon and Rectal Surgery)  Saskia Chávez MD (Orthopedic Surgery)    REVIEW OF SYSTEMS     Review of Systems       PHYSICAL EXAMINATION     Physical Exam  Alert with normal thought and judgment.   Obese   Cardiovascular: Normal rate, regular rhythm.       REVIEWED DATA     Labs:     Lab Results   Component Value Date     07/29/2024    K 4.3 07/29/2024    CALCIUM 9.4 07/29/2024    AST 28 07/29/2024    ALT 37 07/29/2024    BUN 14 07/29/2024    CREATININE 0.94 07/29/2024    CREATININE 0.97 07/18/2023    CREATININE 1.02 06/15/2022    EGFRRESULT 97.5 07/29/2024     Lab Results   Component Value Date    HGBA1C 5.70 (H) 07/29/2024    HGBA1C 5.60 07/18/2023    HGBA1C 5.6 06/15/2022     Lab Results   Component Value Date    LDL 86 07/29/2024    LDL 59 07/18/2023    LDL 75 06/15/2022    HDL 48 07/29/2024    HDL 47 07/18/2023    TRIG 144 07/29/2024    TRIG 108 07/18/2023     Lab Results   Component Value Date    TSH 2.890 07/29/2024    TSH 2.940 07/18/2023    TSH 2.300 01/29/2021    FREET4 1.22 07/29/2024    FREET4 1.29 07/18/2023    FREET4 1.07 01/29/2021     Lab Results   Component Value Date    WBC 5.78 08/14/2024    HGB 14.7 08/14/2024     08/14/2024   No results found for: \"MALBCRERATIO\"        Imaging:               Medical Tests:               Summary of old records / correspondence / consultant report:             Request outside " records:

## 2025-01-08 ENCOUNTER — PRIOR AUTHORIZATION (OUTPATIENT)
Dept: INTERNAL MEDICINE | Age: 55
End: 2025-01-08
Payer: COMMERCIAL

## 2025-01-08 NOTE — TELEPHONE ENCOUNTER
Candesartan Cilexetil 32MG tablets      [employer] [place] [employer]       Outcome  Approved today by [place] [employer] 2017  The request has been approved. The authorization is effective from 01/08/2025 to 01/08/2026, as long as the member is enrolled in their current health plan. A written notification letter will follow with additional details.  Authorization Expiration Date: 1/7/2026

## 2025-02-14 ENCOUNTER — OFFICE VISIT (OUTPATIENT)
Dept: INTERNAL MEDICINE | Age: 55
End: 2025-02-14
Payer: COMMERCIAL

## 2025-02-14 VITALS
SYSTOLIC BLOOD PRESSURE: 152 MMHG | HEART RATE: 82 BPM | HEIGHT: 68 IN | OXYGEN SATURATION: 97 % | TEMPERATURE: 97.3 F | BODY MASS INDEX: 36.37 KG/M2 | WEIGHT: 240 LBS | DIASTOLIC BLOOD PRESSURE: 92 MMHG

## 2025-02-14 DIAGNOSIS — R73.09 ELEVATED HEMOGLOBIN A1C: Chronic | ICD-10-CM

## 2025-02-14 DIAGNOSIS — E66.812 CLASS 2 SEVERE OBESITY DUE TO EXCESS CALORIES WITH SERIOUS COMORBIDITY AND BODY MASS INDEX (BMI) OF 36.0 TO 36.9 IN ADULT: Chronic | ICD-10-CM

## 2025-02-14 DIAGNOSIS — I10 PRIMARY HYPERTENSION: Primary | Chronic | ICD-10-CM

## 2025-02-14 DIAGNOSIS — R29.818 SUSPECTED SLEEP APNEA: Chronic | ICD-10-CM

## 2025-02-14 DIAGNOSIS — E78.2 MIXED HYPERLIPIDEMIA: Chronic | ICD-10-CM

## 2025-02-14 DIAGNOSIS — E66.01 CLASS 2 SEVERE OBESITY DUE TO EXCESS CALORIES WITH SERIOUS COMORBIDITY AND BODY MASS INDEX (BMI) OF 36.0 TO 36.9 IN ADULT: Chronic | ICD-10-CM

## 2025-02-14 PROBLEM — Z12.5 SCREENING FOR PROSTATE CANCER: Status: RESOLVED | Noted: 2024-07-30 | Resolved: 2025-02-14
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
## 2025-02-14 PROBLEM — Z00.01 ENCOUNTER FOR ROUTINE ADULT HEALTH EXAMINATION WITH ABNORMAL FINDINGS: Status: RESOLVED | Noted: 2024-07-30 | Resolved: 2025-02-14

## 2025-02-14 PROBLEM — K62.9 DISORDER OF ANUS: Status: RESOLVED | Noted: 2023-01-24 | Resolved: 2025-02-14

## 2025-02-14 PROCEDURE — 99214 OFFICE O/P EST MOD 30 MIN: CPT | Performed by: INTERNAL MEDICINE

## 2025-02-14 RX ORDER — CANDESARTAN 32 MG/1
32 TABLET ORAL EVERY MORNING
Qty: 90 TABLET | Refills: 1 | Status: SHIPPED | OUTPATIENT
Start: 2025-02-14

## 2025-02-14 RX ORDER — HYDROCHLOROTHIAZIDE 12.5 MG/1
12.5 TABLET ORAL EVERY MORNING
Qty: 90 TABLET | Refills: 1 | Status: SHIPPED | OUTPATIENT
Start: 2025-02-14

## 2025-02-14 RX ORDER — AMLODIPINE BESYLATE 10 MG/1
10 TABLET ORAL EVERY EVENING
Qty: 90 TABLET | Refills: 1 | Status: SHIPPED | OUTPATIENT
Start: 2025-02-14

## 2025-02-14 RX ORDER — ROSUVASTATIN CALCIUM 10 MG/1
10 TABLET, COATED ORAL NIGHTLY
Qty: 90 TABLET | Refills: 1 | Status: SHIPPED | OUTPATIENT
Start: 2025-02-14

## 2025-02-14 NOTE — ASSESSMENT & PLAN NOTE
BP Readings from Last 3 Encounters:   02/14/25 152/92   10/08/24 144/90   07/29/24 128/86      Add HCTZ 12.5 mg qAM   Continue candesartan 32 mg (take in AM) and amlodipine 10 mg qPM.   Monitor home blood pressure readings.    Maintain low sodium diet of < 3000 mg / day.   Weight loss advised.   Referral for sleep study.

## 2025-02-24 ENCOUNTER — CLINICAL SUPPORT (OUTPATIENT)
Dept: INTERNAL MEDICINE | Age: 55
End: 2025-02-24
Payer: COMMERCIAL

## 2025-02-24 ENCOUNTER — EDUCATION (OUTPATIENT)
Dept: INTERNAL MEDICINE | Age: 55
End: 2025-02-24

## 2025-02-24 VITALS — DIASTOLIC BLOOD PRESSURE: 88 MMHG | SYSTOLIC BLOOD PRESSURE: 140 MMHG

## 2025-02-24 DIAGNOSIS — I10 PRIMARY HYPERTENSION: Primary | Chronic | ICD-10-CM

## 2025-03-05 ENCOUNTER — OFFICE VISIT (OUTPATIENT)
Facility: HOSPITAL | Age: 55
End: 2025-03-05
Payer: COMMERCIAL

## 2025-03-05 VITALS
WEIGHT: 231 LBS | HEIGHT: 68 IN | BODY MASS INDEX: 35.01 KG/M2 | SYSTOLIC BLOOD PRESSURE: 136 MMHG | OXYGEN SATURATION: 96 % | DIASTOLIC BLOOD PRESSURE: 68 MMHG

## 2025-03-05 DIAGNOSIS — S82.872S CLOSED DISPLACED PILON FRACTURE OF LEFT TIBIA, SEQUELA: ICD-10-CM

## 2025-03-05 DIAGNOSIS — I10 PRIMARY HYPERTENSION: Chronic | ICD-10-CM

## 2025-03-05 DIAGNOSIS — E78.5 HYPERLIPIDEMIA, UNSPECIFIED HYPERLIPIDEMIA TYPE: Chronic | ICD-10-CM

## 2025-03-05 DIAGNOSIS — E66.01 CLASS 2 SEVERE OBESITY DUE TO EXCESS CALORIES WITH SERIOUS COMORBIDITY AND BODY MASS INDEX (BMI) OF 36.0 TO 36.9 IN ADULT: Chronic | ICD-10-CM

## 2025-03-05 DIAGNOSIS — R29.818 SUSPECTED SLEEP APNEA: Primary | ICD-10-CM

## 2025-03-05 DIAGNOSIS — E66.812 CLASS 2 SEVERE OBESITY DUE TO EXCESS CALORIES WITH SERIOUS COMORBIDITY AND BODY MASS INDEX (BMI) OF 36.0 TO 36.9 IN ADULT: Chronic | ICD-10-CM

## 2025-03-05 DIAGNOSIS — G47.33 OSA (OBSTRUCTIVE SLEEP APNEA): ICD-10-CM

## 2025-03-05 PROCEDURE — G0463 HOSPITAL OUTPT CLINIC VISIT: HCPCS

## 2025-03-05 NOTE — PROGRESS NOTES
Patient Care Team:  Kelby Seo MD as PCP - General (Internal Medicine)  Sharddha Parsons PA-C as Physician Assistant (Colon and Rectal Surgery)  Saskia Chávez MD (Orthopedic Surgery)  Max Nettles MD, MPH as Consulting Physician (Sleep Medicine)        History of Present Illness  The patient is a 54-year-old male who presents for evaluation of sleep apnea.    He reports a history of snoring, as informed by others, although he does not have a bed partner to confirm this. He has experienced episodes of waking up gasping for air but does not report any associated coughing. His sleep schedule typically involves going to bed at midnight and waking up between 7:30 and 8:30 in the morning. He does not frequently nap during the day, except when seated in a chair, and attributes any daytime sleepiness to his pain medication. He has been managing his weight through dietary modifications and has recently been cleared by his physician to resume physical activity, including cycling. He has gained approximately 30 pounds due to decreased mobility following an ankle injury sustained from a fall from a ladder at work two years ago. This injury resulted in a crushed ankle, necessitating four surgeries and the placement of a rufino for fusion. He was recently advised by Dr. Seo to initiate weight loss efforts. He has been compliant with his diet and reports satisfactory sleep quality. He has been managing his weight through dietary modifications and has recently been cleared by his physician to resume physical activity, including cycling. He has gained approximately 30 pounds due to decreased mobility following an ankle injury sustained from a fall from a ladder at work two years ago. This injury resulted in a crushed ankle, necessitating four surgeries and the placement of a rufino for fusion. He was recently advised by Dr. Seo to initiate weight loss efforts. He has been compliant with his diet and reports satisfactory  sleep quality.    He has a history of hypertension and hypercholesterolemia, both of which are managed with medication. He has not had a recent blood pressure check.    Supplemental Information  He had a melanoma 25 to 30 years ago and was checked every year for several years.       [place]: 3    Data Reviewed: Medical chart and sleep questionnaire      PMH:  Past Medical History:   Diagnosis Date    Allergic rhinitis     Anemia     Carpal tunnel syndrome     Colon polyps     FOLLOWED BY DR. SYDNEY ARCHER    Disorder of anus 01/24/2023    ED (erectile dysfunction)     Elevated hemoglobin A1c     Erectile dysfunction 07/22/2019    History of melanoma 07/22/2019    S/p resection (on upper back)(1990's)    Hyperlipidemia     Hypertension     Impaired fasting glucose     Malignant melanoma     Patellofemoral disorder of right knee     Reactive depression (situational)     Vitamin D deficiency           Allergies:  Patient has no known allergies.     Medication Review:   Current Outpatient Medications on File Prior to Visit   Medication Sig Dispense Refill    amLODIPine (NORVASC) 10 MG tablet Take 1 tablet by mouth Every Evening. 90 tablet 1    candesartan (ATACAND) 32 MG tablet Take 1 tablet by mouth Every Morning. 90 tablet 1    fluticasone (FLONASE) 50 MCG/ACT nasal spray Administer 2 sprays into the nostril(s) as directed by provider Daily. Administer 2 sprays in each nostril for each dose.      hydroCHLOROthiazide 12.5 MG tablet Take 1 tablet by mouth Every Morning. 90 tablet 1    Multiple Vitamin (MULTI-VITAMIN) tablet Take 1 tablet by mouth Daily.      rosuvastatin (CRESTOR) 10 MG tablet Take 1 tablet by mouth Every Night. 90 tablet 1    sildenafil (REVATIO) 20 MG tablet Take 2-5 tablets 1 hour before sexual activity as needed 20 tablet 2    vitamin D3 125 MCG (5000 UT) capsule capsule Take 1 capsule by mouth Daily.       No current facility-administered medications on file prior to visit.         Vital Signs:   "  Vitals:    03/05/25 0823   BP: 136/68   SpO2: 96%   Weight: 105 kg (231 lb)   Height: 172.7 cm (68\")        Body mass index is 35.12 kg/m².  Neck Circumference: 18.25 inches  .BMIFOLLOWUP         Physical Exam:    Constitutional:  Well developed 54 y.o. male that appears in no apparent distress.  Awake & oriented times 3.  Normal mood with normal recent and remote memory and normal judgement.  Eyes:  Conjunctivae normal.  Oropharynx: Moist mucous membranes without exudate and Mallampati 4 with macroglossia  Neck: Trachea midline  Respiratory: Effort is not labored  Cardiovascular: Radial pulse regular  Musculoskeletal: Gait appears normal, no digital clubbing evident, no pre-tibial edema        Impression:   Encounter Diagnoses   Name Primary?    Suspected sleep apnea Yes    Closed displaced pilon fracture of left tibia, sequela     Class 2 severe obesity due to excess calories with serious comorbidity and body mass index (BMI) of 36.0 to 36.9 in adult     AMAYA (obstructive sleep apnea)     Hyperlipidemia, unspecified hyperlipidemia type     Primary hypertension      Patient's BMI is Body mass index is 35.12 kg/m².        Assessment & Plan  1. Suspected sleep apnea.  His upper airway is constricted, contributing to his difficulty in breathing during sleep. He has a Mallampati class IV score, indicating a tight upper airway. He reports snoring and occasional episodes of waking up gasping for breath. He has gained 30 pounds due to inactivity following an ankle injury. He has been cleared by his doctor to start exercising and has been advised to lose weight. Weight loss is recommended as it can help alleviate sleep apnea symptoms. A home sleep test will be conducted to evaluate for potential sleep apnea. The test will measure chest expansion, airflow, oxygen saturation, heart rate, snoring, and body position. An appointment will be scheduled at the sleep center for instruction on how to use the home sleep test " equipment.    2. Hypertension.  He is currently on medication for high blood pressure. Dr. Seo suggested that untreated sleep apnea might be contributing to his hypertension. The home sleep test will help determine if sleep apnea is present and if treating it could improve his blood pressure.    3. Hypercholesterolemia.  He is on medication for high cholesterol.       The patient should practice good sleep hygiene measures.      Weight loss might be beneficial in this patient who has a Body mass index is 35.12 kg/m².      Pathophysiology of AMAYA described to the patient.  Cardiovascular complications of untreated AMAYA also reviewed.      The patient was cautioned about the dangers of drowsy driving.    Patient or patient representative verbalized consent for the use of Ambient Listening during the visit with  Jan Nettles MD for chart documentation. 3/5/2025  09:07 NOE Nettles MD  Sleep Medicine  03/05/25  08:55 EST

## 2025-03-13 ENCOUNTER — HOSPITAL ENCOUNTER (OUTPATIENT)
Dept: SLEEP MEDICINE | Facility: HOSPITAL | Age: 55
Discharge: HOME | End: 2025-03-13
Admitting: PSYCHIATRY & NEUROLOGY
Payer: MEDICAID

## 2025-03-13 DIAGNOSIS — S82.872S CLOSED DISPLACED PILON FRACTURE OF LEFT TIBIA, SEQUELA: ICD-10-CM

## 2025-03-13 DIAGNOSIS — G47.33 OSA (OBSTRUCTIVE SLEEP APNEA): ICD-10-CM

## 2025-03-13 DIAGNOSIS — E78.5 HYPERLIPIDEMIA, UNSPECIFIED HYPERLIPIDEMIA TYPE: Chronic | ICD-10-CM

## 2025-03-13 DIAGNOSIS — E66.812 CLASS 2 SEVERE OBESITY DUE TO EXCESS CALORIES WITH SERIOUS COMORBIDITY AND BODY MASS INDEX (BMI) OF 36.0 TO 36.9 IN ADULT: Chronic | ICD-10-CM

## 2025-03-13 DIAGNOSIS — R29.818 SUSPECTED SLEEP APNEA: ICD-10-CM

## 2025-03-13 DIAGNOSIS — I10 PRIMARY HYPERTENSION: Chronic | ICD-10-CM

## 2025-03-13 DIAGNOSIS — E66.01 CLASS 2 SEVERE OBESITY DUE TO EXCESS CALORIES WITH SERIOUS COMORBIDITY AND BODY MASS INDEX (BMI) OF 36.0 TO 36.9 IN ADULT: Chronic | ICD-10-CM

## 2025-03-13 PROCEDURE — G0399 HOME SLEEP TEST/TYPE 3 PORTA: HCPCS

## 2025-03-17 ENCOUNTER — TELEPHONE (OUTPATIENT)
Dept: SLEEP MEDICINE | Facility: HOSPITAL | Age: 55
End: 2025-03-17
Payer: MEDICAID

## 2025-03-17 DIAGNOSIS — E78.5 HYPERLIPIDEMIA, UNSPECIFIED HYPERLIPIDEMIA TYPE: ICD-10-CM

## 2025-03-17 DIAGNOSIS — G47.33 OSA (OBSTRUCTIVE SLEEP APNEA): ICD-10-CM

## 2025-03-17 DIAGNOSIS — E66.01 CLASS 2 SEVERE OBESITY DUE TO EXCESS CALORIES WITH SERIOUS COMORBIDITY AND BODY MASS INDEX (BMI) OF 36.0 TO 36.9 IN ADULT: ICD-10-CM

## 2025-03-17 DIAGNOSIS — E66.812 CLASS 2 SEVERE OBESITY DUE TO EXCESS CALORIES WITH SERIOUS COMORBIDITY AND BODY MASS INDEX (BMI) OF 36.0 TO 36.9 IN ADULT: ICD-10-CM

## 2025-03-17 DIAGNOSIS — I10 PRIMARY HYPERTENSION: Primary | ICD-10-CM

## 2025-03-17 PROCEDURE — 95806 SLEEP STUDY UNATT&RESP EFFT: CPT | Performed by: PSYCHIATRY & NEUROLOGY

## 2025-04-14 ENCOUNTER — OFFICE VISIT (OUTPATIENT)
Dept: INTERNAL MEDICINE | Age: 55
End: 2025-04-14
Payer: MEDICAID

## 2025-04-14 VITALS
DIASTOLIC BLOOD PRESSURE: 80 MMHG | HEIGHT: 68 IN | SYSTOLIC BLOOD PRESSURE: 132 MMHG | TEMPERATURE: 99 F | BODY MASS INDEX: 31.83 KG/M2 | OXYGEN SATURATION: 99 % | HEART RATE: 73 BPM | WEIGHT: 210 LBS

## 2025-04-14 DIAGNOSIS — E66.09 CLASS 1 OBESITY DUE TO EXCESS CALORIES WITH SERIOUS COMORBIDITY AND BODY MASS INDEX (BMI) OF 31.0 TO 31.9 IN ADULT: Chronic | ICD-10-CM

## 2025-04-14 DIAGNOSIS — E66.811 CLASS 1 OBESITY DUE TO EXCESS CALORIES WITH SERIOUS COMORBIDITY AND BODY MASS INDEX (BMI) OF 31.0 TO 31.9 IN ADULT: Chronic | ICD-10-CM

## 2025-04-14 DIAGNOSIS — R73.09 ELEVATED HEMOGLOBIN A1C: Chronic | ICD-10-CM

## 2025-04-14 DIAGNOSIS — E78.5 HYPERLIPIDEMIA, UNSPECIFIED HYPERLIPIDEMIA TYPE: Chronic | ICD-10-CM

## 2025-04-14 DIAGNOSIS — I10 PRIMARY HYPERTENSION: Primary | Chronic | ICD-10-CM

## 2025-04-14 DIAGNOSIS — Z00.00 ENCOUNTER FOR ANNUAL HEALTH EXAMINATION: ICD-10-CM

## 2025-04-14 DIAGNOSIS — Z12.5 SCREENING PSA (PROSTATE SPECIFIC ANTIGEN): ICD-10-CM

## 2025-04-14 NOTE — ASSESSMENT & PLAN NOTE
Lab Results   Component Value Date    HGBA1C 5.70 (H) 07/29/2024    HGBA1C 5.60 07/18/2023    HGBA1C 5.6 06/15/2022      Continue good lifestyle changes for weight loss.

## 2025-04-14 NOTE — PROGRESS NOTES
J  U  N  O  H    K  I  M ,   M  D                             I  N  T  E  R  N  A  L    M  E  D  I  C  I  N  E         ENCOUNTER DATE:  04/14/2025    Jackson DOTSON Stewart / 54 y.o. / male    OFFICE VISIT ENCOUNTER       CHIEF COMPLAINT / REASON FOR OFFICE VISIT     Hypertension      ASSESSMENT & PLAN     Problem List Items Addressed This Visit          High    Hypertension - Primary (Chronic)    Overview   Continue current medications including candesartan 32 mg and hydrochlorothiazide 12.5 mg in the morning and amlodipine 10 mg in the evening.          Current Assessment & Plan   Blood pressure is improved.  It is well-controlled here and at home.  Continue current medications including candesartan 32 mg and hydrochlorothiazide 12.5 mg in the morning and amlodipine 10 mg in the evening.      BP Readings from Last 3 Encounters:   04/14/25 132/80   03/05/25 136/68   02/24/25 140/88             Relevant Medications    hydroCHLOROthiazide 12.5 MG tablet    amLODIPine (NORVASC) 10 MG tablet    candesartan (ATACAND) 32 MG tablet       Medium    Hyperlipidemia (Chronic)    Overview   Continue rosuvastatin 10 mg qd.          Relevant Medications    rosuvastatin (CRESTOR) 10 MG tablet    Elevated hemoglobin A1c (Chronic)    Current Assessment & Plan   Lab Results   Component Value Date    HGBA1C 5.70 (H) 07/29/2024    HGBA1C 5.60 07/18/2023    HGBA1C 5.6 06/15/2022      Continue good lifestyle changes for weight loss.          Class 1 obesity due to excess calories with serious comorbidity and body mass index (BMI) of 31.0 to 31.9 in adult (Chronic)    Current Assessment & Plan   Wt Readings from Last 3 Encounters:   04/14/25 95.3 kg (210 lb)   03/05/25 105 kg (231 lb)   02/14/25 109 kg (240 lb)     Body mass index is 31.93 kg/m².     Excellent weight loss with lifestyle changes. Continue same.   Recommended target weight of 190 lbs.           Other Visit Diagnoses         Screening PSA  (prostate specific antigen)        Relevant Orders    PSA Screen      Encounter for annual health examination        Relevant Orders    CBC & Differential    Comprehensive Metabolic Panel    Hemoglobin A1c    Lipid Panel With / Chol / HDL Ratio    PSA Screen    TSH+Free T4    Urinalysis With Microscopic If Indicated (No Culture) - Urine, Clean Catch          Orders Placed This Encounter   Procedures    Comprehensive Metabolic Panel     Standing Status:   Future     Expected Date:   7/1/2025     Expiration Date:   5/19/2026     Release to patient:   Routine Release [[id number]]    Hemoglobin A1c     Standing Status:   Future     Expected Date:   7/1/2025     Expiration Date:   5/19/2026     Release to patient:   Routine Release [[id number]]    Lipid Panel With / Chol / HDL Ratio     Standing Status:   Future     Expected Date:   7/1/2025     Expiration Date:   5/19/2026     Release to patient:   Routine Release [[id number]]    PSA Screen     Standing Status:   Future     Expected Date:   7/1/2025     Expiration Date:   5/19/2026     Release to patient:   Routine Release [[id number]]    TSH+Free T4     Standing Status:   Future     Expected Date:   7/1/2025     Expiration Date:   5/19/2026     Release to patient:   Routine Release [[id number]]    Urinalysis With Microscopic If Indicated (No Culture) - Urine, Clean Catch     Standing Status:   Future     Expected Date:   7/1/2025     Expiration Date:   5/19/2026     Release to patient:   Routine Release [[id number]]    CBC & Differential     Standing Status:   Future     Expected Date:   7/1/2025     Expiration Date:   5/19/2026     Manual Differential:   No     Release to patient:   Routine Release [[id number]]     No orders of the defined types were placed in this encounter.      SUMMARY/DISCUSSION        TOTAL TIME OF ENCOUNTER:        Next Appointment with me: 7/30/2025     Return for Next scheduled followup for CPE.      VITAL SIGNS     Vitals:    04/14/25 1550  "  BP: 132/80   Pulse: 73   Temp: 99 °F (37.2 °C)   SpO2: 99%   Weight: 95.3 kg (210 lb)   Height: 172.7 cm (68\")       BP Readings from Last 3 Encounters:   04/14/25 132/80   03/05/25 136/68   02/24/25 140/88     Wt Readings from Last 3 Encounters:   04/14/25 95.3 kg (210 lb)   03/05/25 105 kg (231 lb)   02/14/25 109 kg (240 lb)     Body mass index is 31.93 kg/m².    Blood pressure readings recorded on patient flowsheet:       No data to display                  MEDICATIONS AT THE TIME OF OFFICE VISIT     Current Outpatient Medications on File Prior to Visit   Medication Sig    amLODIPine (NORVASC) 10 MG tablet Take 1 tablet by mouth Every Evening.    candesartan (ATACAND) 32 MG tablet Take 1 tablet by mouth Every Morning.    fluticasone (FLONASE) 50 MCG/ACT nasal spray Administer 2 sprays into the nostril(s) as directed by provider Daily. Administer 2 sprays in each nostril for each dose.    hydroCHLOROthiazide 12.5 MG tablet Take 1 tablet by mouth Every Morning.    Multiple Vitamin (MULTI-VITAMIN) tablet Take 1 tablet by mouth Daily.    rosuvastatin (CRESTOR) 10 MG tablet Take 1 tablet by mouth Every Night.    sildenafil (REVATIO) 20 MG tablet Take 2-5 tablets 1 hour before sexual activity as needed    vitamin D3 125 MCG (5000 UT) capsule capsule Take 1 capsule by mouth Daily.     No current facility-administered medications on file prior to visit.          HISTORY OF PRESENT ILLNESS     He has made significant lifestyle and dietary changes and has lost significant amount of weight.  He has gone from 231 pounds down to 210 pounds and his BMI is now 31.9.  Previously hydrochlorothiazide was added for hypertension his blood pressure is very well-controlled at home.  Denies significant side effects.  He is on rosuvastatin 10 mg for hyperlipidemia without significant myalgia.    Patient Care Team:  Kelby Seo MD as PCP - General (Internal Medicine)  Shraddha Parsons PA-C as Physician Assistant (Colon and Rectal " "Surgery)  Saskia Chávez MD (Orthopedic Surgery)    REVIEW OF SYSTEMS     Review of Systems   Constitutional neg except per HPI   Resp neg  CV neg     PHYSICAL EXAMINATION     Physical Exam  Weight loss noted        REVIEWED DATA     Labs:     Lab Results   Component Value Date     07/29/2024    K 4.3 07/29/2024    CALCIUM 9.4 07/29/2024    AST 28 07/29/2024    ALT 37 07/29/2024    BUN 14 07/29/2024    CREATININE 0.94 07/29/2024    CREATININE 0.97 07/18/2023    CREATININE 1.02 06/15/2022    EGFR 97.5 07/29/2024     Lab Results   Component Value Date    HGBA1C 5.70 (H) 07/29/2024    HGBA1C 5.60 07/18/2023    HGBA1C 5.6 06/15/2022   No results found for: \"MALBCRERATIO\"  Lab Results   Component Value Date    LDL 86 07/29/2024    LDL 59 07/18/2023    LDL 75 06/15/2022    HDL 48 07/29/2024    HDL 47 07/18/2023    TRIG 144 07/29/2024    TRIG 108 07/18/2023     Lab Results   Component Value Date    TSH 2.890 07/29/2024    TSH 2.940 07/18/2023    TSH 2.300 01/29/2021    FREET4 1.22 07/29/2024    FREET4 1.29 07/18/2023    FREET4 1.07 01/29/2021     Lab Results   Component Value Date    WBC 5.78 08/14/2024    HGB 14.7 08/14/2024     08/14/2024           Imaging:               Medical Tests:               Summary of old records / correspondence / consultant report:             Request outside records:       "

## 2025-04-14 NOTE — ASSESSMENT & PLAN NOTE
Blood pressure is improved.  It is well-controlled here and at home.  Continue current medications including candesartan 32 mg and hydrochlorothiazide 12.5 mg in the morning and amlodipine 10 mg in the evening.      BP Readings from Last 3 Encounters:   04/14/25 132/80   03/05/25 136/68   02/24/25 140/88

## 2025-04-14 NOTE — ASSESSMENT & PLAN NOTE
Wt Readings from Last 3 Encounters:   04/14/25 95.3 kg (210 lb)   03/05/25 105 kg (231 lb)   02/14/25 109 kg (240 lb)     Body mass index is 31.93 kg/m².     Excellent weight loss with lifestyle changes. Continue same.   Recommended target weight of 190 lbs.

## 2025-07-30 ENCOUNTER — OFFICE VISIT (OUTPATIENT)
Dept: INTERNAL MEDICINE | Age: 55
End: 2025-07-30
Payer: MEDICAID

## 2025-07-30 VITALS
OXYGEN SATURATION: 98 % | WEIGHT: 221 LBS | BODY MASS INDEX: 33.49 KG/M2 | HEART RATE: 49 BPM | HEIGHT: 68 IN | DIASTOLIC BLOOD PRESSURE: 78 MMHG | TEMPERATURE: 97.1 F | SYSTOLIC BLOOD PRESSURE: 130 MMHG

## 2025-07-30 DIAGNOSIS — R73.09 ELEVATED HEMOGLOBIN A1C: Chronic | ICD-10-CM

## 2025-07-30 DIAGNOSIS — I10 PRIMARY HYPERTENSION: Chronic | ICD-10-CM

## 2025-07-30 DIAGNOSIS — Z00.00 ENCOUNTER FOR ANNUAL HEALTH EXAMINATION: Primary | ICD-10-CM

## 2025-07-30 DIAGNOSIS — E66.09 CLASS 1 OBESITY DUE TO EXCESS CALORIES WITH SERIOUS COMORBIDITY AND BODY MASS INDEX (BMI) OF 33.0 TO 33.9 IN ADULT: Chronic | ICD-10-CM

## 2025-07-30 DIAGNOSIS — E66.811 CLASS 1 OBESITY DUE TO EXCESS CALORIES WITH SERIOUS COMORBIDITY AND BODY MASS INDEX (BMI) OF 33.0 TO 33.9 IN ADULT: Chronic | ICD-10-CM

## 2025-07-30 DIAGNOSIS — E78.5 HYPERLIPIDEMIA, UNSPECIFIED HYPERLIPIDEMIA TYPE: Chronic | ICD-10-CM

## 2025-07-30 DIAGNOSIS — G47.33 OSA (OBSTRUCTIVE SLEEP APNEA): Chronic | ICD-10-CM

## 2025-07-30 NOTE — ASSESSMENT & PLAN NOTE
Lab Results   Component Value Date    GLUCOSE 105 (H) 07/23/2025    GLUCOSE 97 07/29/2024    GLUCOSE 101 (H) 07/18/2023     Lab Results   Component Value Date    HGBA1C 5.40 07/23/2025    HGBA1C 5.70 (H) 07/29/2024    HGBA1C 5.60 07/18/2023      Maintain a low sugar/starch/carbohydrate diet. Weight loss advised. Consider GLP-1/GIP agonist for weight loss. Decrease fruit.

## 2025-07-30 NOTE — ASSESSMENT & PLAN NOTE
Wt Readings from Last 3 Encounters:   07/30/25 100 kg (221 lb)   04/14/25 95.3 kg (210 lb)   03/05/25 105 kg (231 lb)     Body mass index is 33.6 kg/m².     Weight gain noted due to inactivity. Weight loss advised. Maintain a low sugar/starch/carbohydrate diet.   Discussed GLP-1/GIP agonist for weight loss. Will let me know after checking with insurance. Zepbound through [employer] may be an option.

## 2025-07-30 NOTE — PROGRESS NOTES
"      Vipul Seo MD   INTERNAL MEDICINE      ENCOUNTER DATE:  07/30/2025    Jackson DOTSON Stewart / 54 y.o. / male    ANNUAL PHYSICAL       CHIEF COMPLAINT     Annual Exam (7/9/24), Hypertension, Hyperlipidemia, and Obesity      VITALS     Vitals:    07/30/25 0805   BP: 130/78   Pulse: (!) 49   Temp: 97.1 °F (36.2 °C)   SpO2: 98%   Weight: 100 kg (221 lb)   Height: 172.7 cm (68\")       BP Readings from Last 3 Encounters:   07/30/25 130/78   04/14/25 132/80   03/05/25 136/68     Wt Readings from Last 3 Encounters:   07/30/25 100 kg (221 lb)   04/14/25 95.3 kg (210 lb)   03/05/25 105 kg (231 lb)      Body mass index is 33.6 kg/m².    Blood pressure readings recorded on patient flowsheet:       No data to display                MEDICATIONS     Current Outpatient Medications on File Prior to Visit   Medication Sig Dispense Refill    amLODIPine (NORVASC) 10 MG tablet Take 1 tablet by mouth Every Evening. 90 tablet 1    candesartan (ATACAND) 32 MG tablet Take 1 tablet by mouth Every Morning. 90 tablet 1    fluticasone (FLONASE) 50 MCG/ACT nasal spray Administer 2 sprays into the nostril(s) as directed by provider Daily. Administer 2 sprays in each nostril for each dose.      hydroCHLOROthiazide 12.5 MG tablet Take 1 tablet by mouth Every Morning. 90 tablet 1    rosuvastatin (CRESTOR) 10 MG tablet Take 1 tablet by mouth Every Night. 90 tablet 1    sildenafil (REVATIO) 20 MG tablet Take 2-5 tablets 1 hour before sexual activity as needed 20 tablet 2       HISTORY OF PRESENT ILLNESS      Jackson presents for annual health maintenance visit.    Patient feels he will soon be released to return to work by his orthopedic surgeon.  Unfortunately due to decreased physical activity he has gained about 10 pounds since last visit.  He does still complain of left ankle pain with prolonged weightbearing activity.  Hypertension remains stable on multidrug regimen including amlodipine candesartan and hydrochlorothiazide.  Cholesterol remains stable " on rosuvastatin 10 mg.  A1c level fortunately is lower but he has persistent mild fasting hyperglycemia.  He reports to eating excessive amounts of fruit but does fairly well restricting carbohydrates.  Previous sleep study showed sleep apnea but unfortunately he has had difficulty tolerating CPAP but has not followed up with his sleep medicine specialist to discuss alternative treatment.    Patient Care Team:  Kelby Seo MD as PCP - General (Internal Medicine)  Shraddha Parsons PA-C as Physician Assistant (Colon and Rectal Surgery)  Cristian Maddox MD as Consulting Physician (Orthopedic Surgery)    General health: multiple medical problems  Lifestyle:  Attempting to lose weight?: Yes   Diet: eats decently and high fruit intake  Exercise: limited physical activity due to health/physical limitations  Tobacco: Former smoker  Alcohol: occasional/infrequent  Work: currently on workmans  Reproductive health:  Sexually active?: No   Concern for STD?: No   Sexual problems?: erectile dysfunction   Sees Urologist?: No   Depression Screening:      PHQ-2 Depression Screening  Little interest or pleasure in doing things?     Feeling down, depressed, or hopeless?     PHQ-2 Total Score                4/14/2025     3:53 PM   PHQ-2/PHQ-9 Depression Screening   Little interest or pleasure in doing things Not at all   Feeling down, depressed, or hopeless Not at all   How difficult have these problems made it for you to do your work, take care of things at home, or get along with other people? Not difficult at all        PHQ-2: 0 (Not depressed)     PHQ-9: 0 (Negative screening for depression)    ______________________________________________________________________    ALLERGIES  No Known Allergies     PFSH:     The following portions of the patient's history were reviewed and updated as appropriate: Allergies / Current Medications / Past Medical History / Surgical History / Social History / Family History    PROBLEM LIST    Patient Active Problem List   Diagnosis    Atopic rhinitis    Anemia    Arthralgia of multiple joints    Carpal tunnel syndrome    Hyperlipidemia    Hypertension    Elevated hemoglobin A1c    AMAYA (obstructive sleep apnea)    Vitamin D deficiency    Class 1 obesity due to excess calories with serious comorbidity and body mass index (BMI) of 33.0 to 33.9 in adult    History of melanoma    Erectile dysfunction    Hemorrhoids    Closed displaced pilon fracture of left tibia    Closed pilon fracture of left tibia       PAST MEDICAL HISTORY  Past Medical History:   Diagnosis Date    Allergic rhinitis     Anemia     Carpal tunnel syndrome     Colon polyps     FOLLOWED BY DR. SYDNEY ARCHER    ED (erectile dysfunction)     Elevated hemoglobin A1c     Erectile dysfunction 2019    History of melanoma 2019    S/p resection (on upper back)()    Hyperlipidemia     Hypertension     Impaired fasting glucose     Malignant melanoma     Patellofemoral disorder of right knee     Reactive depression (situational)     Vitamin D deficiency        SURGICAL HISTORY  Past Surgical History:   Procedure Laterality Date    ANKLE FUSION Left 10/2024    ANKLE SURGERY  2023    2nd 23    COLONOSCOPY N/A 2019    ANATOMICALLY NORMAL COLON, RESCOPE 5 YEARS. DR. LEONCIO ALCALA @ [facility].    COLONOSCOPY N/A 2023    5 MM TUBULAR ADENOMA POLYP IN TRANSVERSE, RESCOPE IN 5 YRS, DR. SYDNEY ARCHER AT [facility]    DENTAL PROCEDURE  2020    sinus lift    FRACTURE SURGERY  3/22/23    Ankle    SKIN CANCER EXCISION N/A     Melanoma, back    TIBIA HARDWARE REMOVAL Left 2024    TIBOTALOCALCANEAL ARTHRODESIS    WRIST SURGERY Right     TENDON REPAIR       SOCIAL HISTORY  Social History     Socioeconomic History    Marital status: Single    Number of children: 0   Tobacco Use    Smoking status: Former     Types: Cigars     Quit date: 2019     Years since quittin.5     Passive exposure: Past    Smokeless  tobacco: Never   Vaping Use    Vaping status: Never Used    Passive vaping exposure: Yes   Substance and Sexual Activity    Alcohol use: Yes     Alcohol/week: 1.0 standard drink of alcohol     Types: 1 Shots of liquor per week     Comment: OCCASIONAL     Drug use: No    Sexual activity: Not Currently     Partners: Female       FAMILY HISTORY  Family History   Problem Relation Age of Onset    Breast cancer Mother     Hypertension Father     Colon polyps Father 84        precancerous    Arthritis Father     Hearing loss Father     No Known Problems Sister     Hypertension Brother     Hypertension Brother     Lung cancer Maternal Grandfather         smoker    Heart failure Paternal Grandfather     Heart attack Paternal Uncle 80    Prostate cancer Neg Hx     Colon cancer Neg Hx     Diabetes Neg Hx     Malig Hyperthermia Neg Hx        IMMUNIZATION HISTORY  Immunization History   Administered Date(s) Administered    Flu Vaccine Quad PF 6-35MO 10/12/2017    Flu Vaccine Quad PF >36MO 10/12/2017    FluMist 2-49yrs 10/12/2017    Fluzone Quad >6mos (Multi-dose) 12/14/2016    Hepatitis A 10/12/2018, 07/29/2020    Influenza Split Preservative Free ID 10/02/2013    Influenza, Unspecified 10/12/2018    TD Preservative Free (Tenivac) 07/25/2023    Tdap 12/14/2016    flucelvax quad pfs =>4 YRS 10/12/2018         REVIEW OF SYSTEMS     Review of Systems   Constitutional:  Positive for unexpected weight change (gain).   HENT: Negative.     Eyes: Negative.    Respiratory:  Positive for apnea.    Cardiovascular: Negative.  Negative for chest pain and palpitations.   Gastrointestinal: Negative.    Endocrine: Negative.         Obesity    Genitourinary: Negative.    Musculoskeletal: Negative.         Left ankle/leg pain   Skin: Negative.         History of precancerous skin surgery (back)   Allergic/Immunologic: Negative.    Neurological: Negative.    Hematological: Negative.    Psychiatric/Behavioral: Negative.           PHYSICAL  EXAMINATION     Physical Exam  Constitutional:       Appearance: He is obese.   HENT:      Head: Normocephalic.      Right Ear: Tympanic membrane, ear canal and external ear normal.      Left Ear: Tympanic membrane, ear canal and external ear normal.      Mouth/Throat:      Comments: Mellampati Airway Class 3   Eyes:      General: No scleral icterus.     Conjunctiva/sclera: Conjunctivae normal.      Pupils: Pupils are equal, round, and reactive to light.   Neck:      Thyroid: No thyromegaly.      Vascular: No carotid bruit.      Trachea: No tracheal deviation.   Cardiovascular:      Rate and Rhythm: Regular rhythm.      Pulses: Normal pulses.      Heart sounds: Normal heart sounds. No murmur heard.     No friction rub. No gallop.      Comments: No carotid bruits  Pulmonary:      Effort: Pulmonary effort is normal.      Breath sounds: Normal breath sounds.   Abdominal:      General: Abdomen is protuberant. There is no distension.      Palpations: Abdomen is soft. There is no mass.      Tenderness: There is no abdominal tenderness.      Hernia: No hernia is present.   Musculoskeletal:      Cervical back: Neck supple.      Right lower leg: Edema (1+) present.      Left lower leg: Edema (1+) present.   Lymphadenopathy:      Cervical: No cervical adenopathy.      Upper Body:      Right upper body: No supraclavicular adenopathy.      Left upper body: No supraclavicular adenopathy.   Skin:     Coloration: Skin is not jaundiced or pale.      Findings: No erythema, lesion (Negative for suspicious skin lesions/growths) or rash.      Nails: There is no clubbing.      Comments: No suspicious skin lesions.    Neurological:      Mental Status: He is alert and oriented to person, place, and time.      Cranial Nerves: No cranial nerve deficit.      Motor: No abnormal muscle tone.   Psychiatric:         Mood and Affect: Mood normal.         Behavior: Behavior normal.         Thought Content: Thought content normal.         Judgment:  Judgment normal.         REVIEWED DATA      Labs:    Lab Results   Component Value Date     07/23/2025    K 4.4 07/23/2025    CALCIUM 9.6 07/23/2025    AST 25 07/23/2025    ALT 28 07/23/2025    BUN 17.0 07/23/2025    CREATININE 1.09 07/23/2025    CREATININE 0.94 07/29/2024    CREATININE 0.97 07/18/2023    EGFR 81.2 07/23/2025     Lab Results   Component Value Date    GLUCOSE 105 (H) 07/23/2025    HGBA1C 5.40 07/23/2025    HGBA1C 5.70 (H) 07/29/2024    HGBA1C 5.60 07/18/2023    TSH 3.040 07/23/2025    FREET4 1.19 07/23/2025     Lab Results   Component Value Date    PSA 0.776 07/23/2025    PSA 0.806 07/29/2024    PSA 0.570 07/18/2023     Lab Results   Component Value Date    TESTOSTEROTT 564 04/05/2017     Lab Results   Component Value Date    LDL 81 07/23/2025    HDL 47 07/23/2025    TRIG 150 07/23/2025    CHOLHDLRATIO 3.28 07/23/2025     Lab Results   Component Value Date    WBC 5.17 07/23/2025    HGB 14.8 07/23/2025    MCV 91.2 07/23/2025     07/23/2025     Lab Results   Component Value Date    PROTEIN Negative 07/23/2025    GLUCOSEU Negative 07/23/2025    BLOODU Negative 07/23/2025    NITRITEU Negative 07/23/2025    LEUKOCYTESUR Negative 07/23/2025     Lab Results   Component Value Date    HEPCVIRUSABY <0.1 07/20/2020       Imaging:                   Medical Tests:                 Summary of old records / correspondence / consultant report:               Request outside records:         ASSESSMENT & PLAN     ANNUAL WELLNESS EXAM / PHYSICAL     Other medical problems addressed today:  Problem List Items Addressed This Visit          High    Hypertension (Chronic)    Overview   Continue candesartan 32 mg and hydrochlorothiazide 12.5 mg in the morning and amlodipine 10 mg in the evening.          Relevant Medications    sildenafil (REVATIO) 20 MG tablet    hydroCHLOROthiazide 12.5 MG tablet    amLODIPine (NORVASC) 10 MG tablet    candesartan (ATACAND) 32 MG tablet       Medium    Hyperlipidemia (Chronic)     Overview   Continue rosuvastatin 10 mg qd.          Relevant Medications    rosuvastatin (CRESTOR) 10 MG tablet    Class 1 obesity due to excess calories with serious comorbidity and body mass index (BMI) of 33.0 to 33.9 in adult (Chronic)    Current Assessment & Plan   Wt Readings from Last 3 Encounters:   07/30/25 100 kg (221 lb)   04/14/25 95.3 kg (210 lb)   03/05/25 105 kg (231 lb)     Body mass index is 33.6 kg/m².     Weight gain noted due to inactivity. Weight loss advised. Maintain a low sugar/starch/carbohydrate diet.   Discussed GLP-1/GIP agonist for weight loss. Will let me know after checking with insurance. Zepbound through [employer] may be an option.          Relevant Medications    rosuvastatin (CRESTOR) 10 MG tablet       Low    Elevated hemoglobin A1c (Chronic)    Current Assessment & Plan   Lab Results   Component Value Date    GLUCOSE 105 (H) 07/23/2025    GLUCOSE 97 07/29/2024    GLUCOSE 101 (H) 07/18/2023     Lab Results   Component Value Date    HGBA1C 5.40 07/23/2025    HGBA1C 5.70 (H) 07/29/2024    HGBA1C 5.60 07/18/2023      Maintain a low sugar/starch/carbohydrate diet. Weight loss advised. Consider GLP-1/GIP agonist for weight loss. Decrease fruit.          Relevant Medications    candesartan (ATACAND) 32 MG tablet    rosuvastatin (CRESTOR) 10 MG tablet    AMAYA (obstructive sleep apnea) (Chronic)    Current Assessment & Plan   Difficulty tolerating CPAP. Advised to follow-up with sleep medicine. Weight loss advised.           Other Visit Diagnoses         Encounter for annual health examination    -  Primary            No orders of the defined types were placed in this encounter.       Summary/Discussion:         Next Appointment with me: Visit date not found    Return in about 6 months (around 1/30/2026) for Reassess chronic medical problems.      HEALTHCARE MAINTENANCE ISSUES     Health Maintenance   Topic Date Due    Pneumococcal Vaccine 50+ (1 of 1 - PCV) Never done    ZOSTER  VACCINE (1 of 2) Never done    ANNUAL PHYSICAL  07/29/2025    INFLUENZA VACCINE  10/01/2025    PROSTATE CANCER SCREENING  07/23/2026    LIPID PANEL  07/23/2026    COLORECTAL CANCER SCREENING  07/17/2028    TDAP/TD VACCINES (3 - Td or Tdap) 07/25/2033    HEPATITIS C SCREENING  Completed    COVID-19 Vaccine  Discontinued       Cancer Screening:  Colon: Initial/Next screening at age: CURRENT and -Colonoscopy  Repeat colon cancer screening: every 5 years  Prostate: Performed today and recommended annual screening  Lung: Does not meet criteria for lung cancer screening.   Testicular: Recommended monthly self exam  Skin: Monthly self skin examination. Advised to schedule dermatologist follow-up.   Other:    Miscellaneous Screening:  CV Screening: Lipid panel  DEXA (75+ or risk factors):   Other:     Immunization/Vaccinations:    Immunization History   Administered Date(s) Administered    Flu Vaccine Quad PF 6-35MO 10/12/2017    Flu Vaccine Quad PF >36MO 10/12/2017    FluMist 2-49yrs 10/12/2017    Fluzone Quad >6mos (Multi-dose) 12/14/2016    Hepatitis A 10/12/2018, 07/29/2020    Influenza Split Preservative Free ID 10/02/2013    Influenza, Unspecified 10/12/2018    TD Preservative Free (Tenivac) 07/25/2023    Tdap 12/14/2016    flucelvax quad pfs =>4 YRS 10/12/2018        Influenza: Recommended annual influenza vaccine  Pneumococcal: Advised to consider vaccine  COVID: Does not plan to get the latest booster  RSV: Not indicated  Tetanus/Pertussis: Up to date  Hepatitis A: Up to date  Hepatitis B: Advised to consider vaccine  Shingles: Recommended Shingrix vaccine  HPV: N/A    Lifestyle Counseling:  Lifestyle Modifications: Attempt to lose weight, Improve dietary compliance, Maintain a low sugar/carbohydrate diet, Follow a low fat, low cholesterol diet, Maintain low sodium diet (< 3 gm) for blood pressure, Make dinner the lightest meal of day, Improve sleep hygiene, and Discussed sexual issues, safe sex practices,  contraception  Safety Issues: Always wear seatbelt, Avoid texting while driving   Use sunscreen, regular skin examination  Recommended annual dental/vision examination.  Emotional/Stress/Sleep: Reviewed and  given when appropriate

## 2025-08-11 DIAGNOSIS — E78.2 MIXED HYPERLIPIDEMIA: Chronic | ICD-10-CM

## 2025-08-11 DIAGNOSIS — I10 PRIMARY HYPERTENSION: Chronic | ICD-10-CM

## 2025-08-11 RX ORDER — AMLODIPINE BESYLATE 10 MG/1
10 TABLET ORAL EVERY EVENING
Qty: 90 TABLET | Refills: 1 | Status: SHIPPED | OUTPATIENT
Start: 2025-08-11

## 2025-08-11 RX ORDER — CANDESARTAN 32 MG/1
32 TABLET ORAL EVERY MORNING
Qty: 90 TABLET | Refills: 1 | Status: SHIPPED | OUTPATIENT
Start: 2025-08-11

## 2025-08-11 RX ORDER — ROSUVASTATIN CALCIUM 10 MG/1
10 TABLET, COATED ORAL NIGHTLY
Qty: 90 TABLET | Refills: 1 | Status: SHIPPED | OUTPATIENT
Start: 2025-08-11

## 2025-08-11 RX ORDER — HYDROCHLOROTHIAZIDE 12.5 MG/1
12.5 TABLET ORAL EVERY MORNING
Qty: 90 TABLET | Refills: 1 | Status: SHIPPED | OUTPATIENT
Start: 2025-08-11